# Patient Record
Sex: MALE | Race: BLACK OR AFRICAN AMERICAN | NOT HISPANIC OR LATINO
[De-identification: names, ages, dates, MRNs, and addresses within clinical notes are randomized per-mention and may not be internally consistent; named-entity substitution may affect disease eponyms.]

---

## 2019-05-28 ENCOUNTER — APPOINTMENT (OUTPATIENT)
Dept: RADIOLOGY | Facility: HOSPITAL | Age: 49
End: 2019-05-28

## 2019-05-28 ENCOUNTER — OUTPATIENT (OUTPATIENT)
Dept: OUTPATIENT SERVICES | Facility: HOSPITAL | Age: 49
LOS: 1 days | End: 2019-05-28
Payer: COMMERCIAL

## 2019-05-28 DIAGNOSIS — R76.11 NONSPECIFIC REACTION TO TUBERCULIN SKIN TEST WITHOUT ACTIVE TUBERCULOSIS: ICD-10-CM

## 2019-05-28 PROCEDURE — 71045 X-RAY EXAM CHEST 1 VIEW: CPT | Mod: 26

## 2019-06-20 ENCOUNTER — INPATIENT (INPATIENT)
Facility: HOSPITAL | Age: 49
LOS: 1 days | Discharge: HOME | End: 2019-06-22
Attending: INTERNAL MEDICINE | Admitting: INTERNAL MEDICINE
Payer: MEDICAID

## 2019-06-20 VITALS
DIASTOLIC BLOOD PRESSURE: 92 MMHG | TEMPERATURE: 98 F | RESPIRATION RATE: 18 BRPM | OXYGEN SATURATION: 100 % | SYSTOLIC BLOOD PRESSURE: 142 MMHG | HEART RATE: 109 BPM

## 2019-06-20 LAB
ALBUMIN SERPL ELPH-MCNC: 4.6 G/DL — SIGNIFICANT CHANGE UP (ref 3.3–5)
ALP SERPL-CCNC: 102 U/L — SIGNIFICANT CHANGE UP (ref 40–120)
ALT FLD-CCNC: 30 U/L — SIGNIFICANT CHANGE UP (ref 4–41)
ANION GAP SERPL CALC-SCNC: 11 MMO/L — SIGNIFICANT CHANGE UP (ref 7–14)
AST SERPL-CCNC: 18 U/L — SIGNIFICANT CHANGE UP (ref 4–40)
BASOPHILS # BLD AUTO: 0.03 K/UL — SIGNIFICANT CHANGE UP (ref 0–0.2)
BASOPHILS NFR BLD AUTO: 0.4 % — SIGNIFICANT CHANGE UP (ref 0–2)
BILIRUB SERPL-MCNC: 0.7 MG/DL — SIGNIFICANT CHANGE UP (ref 0.2–1.2)
BUN SERPL-MCNC: 11 MG/DL — SIGNIFICANT CHANGE UP (ref 7–23)
CALCIUM SERPL-MCNC: 9.8 MG/DL — SIGNIFICANT CHANGE UP (ref 8.4–10.5)
CHLORIDE SERPL-SCNC: 104 MMOL/L — SIGNIFICANT CHANGE UP (ref 98–107)
CO2 SERPL-SCNC: 21 MMOL/L — LOW (ref 22–31)
CREAT SERPL-MCNC: 0.9 MG/DL — SIGNIFICANT CHANGE UP (ref 0.5–1.3)
D DIMER BLD IA.RAPID-MCNC: 460 NG/ML — SIGNIFICANT CHANGE UP
EOSINOPHIL # BLD AUTO: 0.22 K/UL — SIGNIFICANT CHANGE UP (ref 0–0.5)
EOSINOPHIL NFR BLD AUTO: 2.9 % — SIGNIFICANT CHANGE UP (ref 0–6)
GLUCOSE SERPL-MCNC: 82 MG/DL — SIGNIFICANT CHANGE UP (ref 70–99)
HCT VFR BLD CALC: 45.8 % — SIGNIFICANT CHANGE UP (ref 39–50)
HGB BLD-MCNC: 15.4 G/DL — SIGNIFICANT CHANGE UP (ref 13–17)
IMM GRANULOCYTES NFR BLD AUTO: 0.3 % — SIGNIFICANT CHANGE UP (ref 0–1.5)
LYMPHOCYTES # BLD AUTO: 2.32 K/UL — SIGNIFICANT CHANGE UP (ref 1–3.3)
LYMPHOCYTES # BLD AUTO: 30.2 % — SIGNIFICANT CHANGE UP (ref 13–44)
MCHC RBC-ENTMCNC: 31 PG — SIGNIFICANT CHANGE UP (ref 27–34)
MCHC RBC-ENTMCNC: 33.6 % — SIGNIFICANT CHANGE UP (ref 32–36)
MCV RBC AUTO: 92.3 FL — SIGNIFICANT CHANGE UP (ref 80–100)
MONOCYTES # BLD AUTO: 0.73 K/UL — SIGNIFICANT CHANGE UP (ref 0–0.9)
MONOCYTES NFR BLD AUTO: 9.5 % — SIGNIFICANT CHANGE UP (ref 2–14)
NEUTROPHILS # BLD AUTO: 4.36 K/UL — SIGNIFICANT CHANGE UP (ref 1.8–7.4)
NEUTROPHILS NFR BLD AUTO: 56.7 % — SIGNIFICANT CHANGE UP (ref 43–77)
NRBC # FLD: 0 K/UL — SIGNIFICANT CHANGE UP (ref 0–0)
PLATELET # BLD AUTO: 282 K/UL — SIGNIFICANT CHANGE UP (ref 150–400)
PMV BLD: 8.9 FL — SIGNIFICANT CHANGE UP (ref 7–13)
POTASSIUM SERPL-MCNC: 4.8 MMOL/L — SIGNIFICANT CHANGE UP (ref 3.5–5.3)
POTASSIUM SERPL-SCNC: 4.8 MMOL/L — SIGNIFICANT CHANGE UP (ref 3.5–5.3)
PROT SERPL-MCNC: 7.7 G/DL — SIGNIFICANT CHANGE UP (ref 6–8.3)
RBC # BLD: 4.96 M/UL — SIGNIFICANT CHANGE UP (ref 4.2–5.8)
RBC # FLD: 13.3 % — SIGNIFICANT CHANGE UP (ref 10.3–14.5)
SODIUM SERPL-SCNC: 136 MMOL/L — SIGNIFICANT CHANGE UP (ref 135–145)
TROPONIN T, HIGH SENSITIVITY: 8 NG/L — SIGNIFICANT CHANGE UP (ref ?–14)
TROPONIN T, HIGH SENSITIVITY: < 6 NG/L — SIGNIFICANT CHANGE UP (ref ?–14)
TSH SERPL-MCNC: 0.81 UIU/ML — SIGNIFICANT CHANGE UP (ref 0.27–4.2)
WBC # BLD: 7.68 K/UL — SIGNIFICANT CHANGE UP (ref 3.8–10.5)
WBC # FLD AUTO: 7.68 K/UL — SIGNIFICANT CHANGE UP (ref 3.8–10.5)

## 2019-06-20 PROCEDURE — 71275 CT ANGIOGRAPHY CHEST: CPT | Mod: 26

## 2019-06-20 PROCEDURE — 71046 X-RAY EXAM CHEST 2 VIEWS: CPT | Mod: 26

## 2019-06-20 RX ORDER — ENOXAPARIN SODIUM 100 MG/ML
80 INJECTION SUBCUTANEOUS ONCE
Refills: 0 | Status: COMPLETED | OUTPATIENT
Start: 2019-06-20 | End: 2019-06-20

## 2019-06-20 RX ADMIN — ENOXAPARIN SODIUM 80 MILLIGRAM(S): 100 INJECTION SUBCUTANEOUS at 23:53

## 2019-06-20 NOTE — ED PROVIDER NOTE - CARE PLAN
Principal Discharge DX:	Chest pain, unspecified type Principal Discharge DX:	Other acute pulmonary embolism with acute cor pulmonale

## 2019-06-20 NOTE — ED PROVIDER NOTE - OBJECTIVE STATEMENT
48 y.o male smoker no pmhx coming in with intermittent, no radiating, no exertional L sided chest pain for the last 2 days, worsens with coughing. This morning woke up felt like his heart was racing. Pt took 2 baby aspirins today. Denies any recent travel. No family hx of heart disease no hx of blood clots. No LE swelling. 48 y.o male smoker no pmhx coming in with intermittent, no radiating, no exertional L sided chest pain for the last 2 days, worsens with coughing. This morning woke up felt like his heart was racing. Pt took 2 baby aspirins today. Denies any recent travel. No family hx of heart disease no hx of blood clots. No LE swelling.    16:17 Hernandez att: 48M smoker c/o cp x 2 days. Past 2 days chest pain, left sided, intermittent, nonexertonal, not pleuritic but worse with cough. Notes some heart racing sensation. Denies travel, leg swellling, prior dvt or pe. No prior cardiac workup. 48 y.o male smoker no pmhx coming in with intermittent, no radiating, no exertional L sided chest pain for the last 2 days, worsens with coughing. This morning woke up felt like his heart was racing. Pt took 2 baby aspirins today. Denies any recent travel. No family hx of heart disease no hx of blood clots. No LE swelling.    16:17 Hernandez att: 48M smoker c/o cp x 2 days. Past 2 days chest pain, left sided, intermittent, nonexertonal, not pleuritic but worse with cough bending or twisting. Notes some heart racing sensation. Denies travel, leg swellling, prior dvt or pe. No prior cardiac workup.

## 2019-06-20 NOTE — ED PROVIDER NOTE - PROGRESS NOTE DETAILS
dylan tinoco: pt seen by MICU, not a candidate at this time. spoke with dr. ugarte accepted to her service. pt VSS , well appearing, no acute distress. discussed directly with MAR on phone.

## 2019-06-20 NOTE — ED PROVIDER NOTE - ATTENDING CONTRIBUTION TO CARE
Dr. Hernandez: I performed a face to face bedside interview with patient regarding history of present illness, review of symptoms and past medical history. I completed an independent physical exam.  I have discussed patient's plan of care with PA.   I agree with note as stated above, having amended the EMR as needed to reflect my findings.   This includes HISTORY OF PRESENT ILLNESS, HIV, PAST MEDICAL/SURGICAL/FAMILY/SOCIAL HISTORY, ALLERGIES AND HOME MEDICATIONS, REVIEW OF SYSTEMS, PHYSICAL EXAM, and any PROGRESS NOTES during the time I functioned as the attending physician for this patient. HPI above as by me. PE above as by me. PLAN cxr, trop, dimer, tsh, cbc, cmp DISPO as per delta trop, either cdu or tele doc office.

## 2019-06-20 NOTE — ED ADULT TRIAGE NOTE - CHIEF COMPLAINT QUOTE
PT C/O left sided CP x 2 days. Pt states he has also had cough for 2 days, states pain is worse when coughing. Breath sounds clear. Denies SOB, fevers, PMH, daily medications.

## 2019-06-20 NOTE — ED ADULT NURSE NOTE - OBJECTIVE STATEMENT
pt is in bed  A and Ox3 in NAD reports " I was having c/p for the last 2 days I am taking aspirin and it is not helping" denies SOB or ULRICH denies cardiac hx. lung sounds clear B/L. orders noted and completed.

## 2019-06-21 DIAGNOSIS — R91.1 SOLITARY PULMONARY NODULE: ICD-10-CM

## 2019-06-21 DIAGNOSIS — I26.99 OTHER PULMONARY EMBOLISM WITHOUT ACUTE COR PULMONALE: ICD-10-CM

## 2019-06-21 DIAGNOSIS — I26.09 OTHER PULMONARY EMBOLISM WITH ACUTE COR PULMONALE: ICD-10-CM

## 2019-06-21 DIAGNOSIS — Z29.9 ENCOUNTER FOR PROPHYLACTIC MEASURES, UNSPECIFIED: ICD-10-CM

## 2019-06-21 LAB
ALBUMIN SERPL ELPH-MCNC: 4 G/DL — SIGNIFICANT CHANGE UP (ref 3.3–5)
ALP SERPL-CCNC: 93 U/L — SIGNIFICANT CHANGE UP (ref 40–120)
ALT FLD-CCNC: 28 U/L — SIGNIFICANT CHANGE UP (ref 4–41)
ANION GAP SERPL CALC-SCNC: 13 MMO/L — SIGNIFICANT CHANGE UP (ref 7–14)
AST SERPL-CCNC: 18 U/L — SIGNIFICANT CHANGE UP (ref 4–40)
BASOPHILS # BLD AUTO: 0.05 K/UL — SIGNIFICANT CHANGE UP (ref 0–0.2)
BASOPHILS NFR BLD AUTO: 0.7 % — SIGNIFICANT CHANGE UP (ref 0–2)
BILIRUB SERPL-MCNC: 0.7 MG/DL — SIGNIFICANT CHANGE UP (ref 0.2–1.2)
BUN SERPL-MCNC: 13 MG/DL — SIGNIFICANT CHANGE UP (ref 7–23)
CALCIUM SERPL-MCNC: 9.1 MG/DL — SIGNIFICANT CHANGE UP (ref 8.4–10.5)
CHLORIDE SERPL-SCNC: 101 MMOL/L — SIGNIFICANT CHANGE UP (ref 98–107)
CHOLEST SERPL-MCNC: 223 MG/DL — HIGH (ref 120–199)
CO2 SERPL-SCNC: 24 MMOL/L — SIGNIFICANT CHANGE UP (ref 22–31)
CREAT SERPL-MCNC: 0.92 MG/DL — SIGNIFICANT CHANGE UP (ref 0.5–1.3)
EOSINOPHIL # BLD AUTO: 0.37 K/UL — SIGNIFICANT CHANGE UP (ref 0–0.5)
EOSINOPHIL NFR BLD AUTO: 5.1 % — SIGNIFICANT CHANGE UP (ref 0–6)
GLUCOSE SERPL-MCNC: 99 MG/DL — SIGNIFICANT CHANGE UP (ref 70–99)
HBA1C BLD-MCNC: 5.6 % — SIGNIFICANT CHANGE UP (ref 4–5.6)
HCT VFR BLD CALC: 42.6 % — SIGNIFICANT CHANGE UP (ref 39–50)
HDLC SERPL-MCNC: 33 MG/DL — LOW (ref 35–55)
HGB BLD-MCNC: 14.3 G/DL — SIGNIFICANT CHANGE UP (ref 13–17)
IMM GRANULOCYTES NFR BLD AUTO: 0.4 % — SIGNIFICANT CHANGE UP (ref 0–1.5)
LIPID PNL WITH DIRECT LDL SERPL: 178 MG/DL — SIGNIFICANT CHANGE UP
LYMPHOCYTES # BLD AUTO: 2.29 K/UL — SIGNIFICANT CHANGE UP (ref 1–3.3)
LYMPHOCYTES # BLD AUTO: 31.6 % — SIGNIFICANT CHANGE UP (ref 13–44)
MAGNESIUM SERPL-MCNC: 2.1 MG/DL — SIGNIFICANT CHANGE UP (ref 1.6–2.6)
MCHC RBC-ENTMCNC: 31.1 PG — SIGNIFICANT CHANGE UP (ref 27–34)
MCHC RBC-ENTMCNC: 33.6 % — SIGNIFICANT CHANGE UP (ref 32–36)
MCV RBC AUTO: 92.6 FL — SIGNIFICANT CHANGE UP (ref 80–100)
MONOCYTES # BLD AUTO: 0.78 K/UL — SIGNIFICANT CHANGE UP (ref 0–0.9)
MONOCYTES NFR BLD AUTO: 10.8 % — SIGNIFICANT CHANGE UP (ref 2–14)
NEUTROPHILS # BLD AUTO: 3.73 K/UL — SIGNIFICANT CHANGE UP (ref 1.8–7.4)
NEUTROPHILS NFR BLD AUTO: 51.4 % — SIGNIFICANT CHANGE UP (ref 43–77)
NRBC # FLD: 0 K/UL — SIGNIFICANT CHANGE UP (ref 0–0)
PHOSPHATE SERPL-MCNC: 4.4 MG/DL — SIGNIFICANT CHANGE UP (ref 2.5–4.5)
PLATELET # BLD AUTO: 252 K/UL — SIGNIFICANT CHANGE UP (ref 150–400)
PMV BLD: 8.9 FL — SIGNIFICANT CHANGE UP (ref 7–13)
POTASSIUM SERPL-MCNC: 4.6 MMOL/L — SIGNIFICANT CHANGE UP (ref 3.5–5.3)
POTASSIUM SERPL-SCNC: 4.6 MMOL/L — SIGNIFICANT CHANGE UP (ref 3.5–5.3)
PROT SERPL-MCNC: 7.5 G/DL — SIGNIFICANT CHANGE UP (ref 6–8.3)
RBC # BLD: 4.6 M/UL — SIGNIFICANT CHANGE UP (ref 4.2–5.8)
RBC # FLD: 13.4 % — SIGNIFICANT CHANGE UP (ref 10.3–14.5)
SODIUM SERPL-SCNC: 138 MMOL/L — SIGNIFICANT CHANGE UP (ref 135–145)
TRIGL SERPL-MCNC: 123 MG/DL — SIGNIFICANT CHANGE UP (ref 10–149)
WBC # BLD: 7.25 K/UL — SIGNIFICANT CHANGE UP (ref 3.8–10.5)
WBC # FLD AUTO: 7.25 K/UL — SIGNIFICANT CHANGE UP (ref 3.8–10.5)

## 2019-06-21 PROCEDURE — 99223 1ST HOSP IP/OBS HIGH 75: CPT | Mod: AI

## 2019-06-21 PROCEDURE — 99223 1ST HOSP IP/OBS HIGH 75: CPT | Mod: GC

## 2019-06-21 RX ORDER — ENOXAPARIN SODIUM 100 MG/ML
90 INJECTION SUBCUTANEOUS EVERY 12 HOURS
Refills: 0 | Status: DISCONTINUED | OUTPATIENT
Start: 2019-06-21 | End: 2019-06-22

## 2019-06-21 RX ORDER — SODIUM CHLORIDE 9 MG/ML
3 INJECTION INTRAMUSCULAR; INTRAVENOUS; SUBCUTANEOUS EVERY 8 HOURS
Refills: 0 | Status: DISCONTINUED | OUTPATIENT
Start: 2019-06-21 | End: 2019-06-22

## 2019-06-21 RX ADMIN — SODIUM CHLORIDE 3 MILLILITER(S): 9 INJECTION INTRAMUSCULAR; INTRAVENOUS; SUBCUTANEOUS at 21:43

## 2019-06-21 RX ADMIN — SODIUM CHLORIDE 3 MILLILITER(S): 9 INJECTION INTRAMUSCULAR; INTRAVENOUS; SUBCUTANEOUS at 13:46

## 2019-06-21 RX ADMIN — ENOXAPARIN SODIUM 90 MILLIGRAM(S): 100 INJECTION SUBCUTANEOUS at 14:07

## 2019-06-21 NOTE — H&P ADULT - NEGATIVE MUSCULOSKELETAL SYMPTOMS
no muscle weakness/no back pain/no leg pain R/no stiffness/no arm pain R/no leg pain L/no joint swelling/no neck pain/no arm pain L

## 2019-06-21 NOTE — CONSULT NOTE ADULT - ATTENDING COMMENTS
47 y/o M smoker w/no significant PMH presenting with dyspnea found to have bilateral PE, emphysema, and RUL pulmonary nodule on CT. Nodule appears more like scarring than malignancy on imaging, however patient is at risk for neoplasm due to smoking history. PE otherwise unprovoked. Patient low risk by PESI criteria, however CT reading possible R heart strain.    - NOAC  - TTE, BNP. Troponin WNL  - Outpatient PET CT for further evaluation of nodule
48M current smoker, no PMHx presenting with chest pain x 2 days, found to have acute bilateral PE with suggestion for RV strain, with 1.1 cm RUL nodule   hemodynamically stable  tte, trend CE  heparin vs lovenox  tele, continuous pulse oxim

## 2019-06-21 NOTE — H&P ADULT - HISTORY OF PRESENT ILLNESS
48 yro male with no PMHx presents to the ED complaining of intermittent nonradiating left-sided sharp chest pain for the past 2 days. Patient states that he first felt the pain when he was walking to work on 6/19, described  5-6/10 pain that is nonpleuritic, but worse when he takes many deep breaths and coughs. The pain is not postprandial, and  nonexertional, as it also occurs when he is sitting. Patient reports that the pain was worse with bending down on the first day, but was not associated with bending down on the second day. Patient also reports some soreness when he touches the left side of the chest. He took 3 aspirin over the course of 2 days, which slightly helped.  He also woke up yesterday feeling like his heart was racing, but it subsided on its own. Patient reports night sweats for the past week, including yesterday, waking up with wet shirts. Pt has been smoking less than a pack of cigarettes a day for 30 years. Denies ever experiencing similar symptoms before. Denies fever, chills, weight loss, fatigue, dizziness, headache, changes in hearing, difficulty swallowing, sob, chronic cough, ULRICH, orthopnea, edema, abdominal pain, n/v/d/c, back/neck pain, arm/leg pain, changes in urinary or bowel patterns, or weakness. No family history of heart disease or history of blood clots. 48 yro male with no PMHx presents to the ED complaining of intermittent nonradiating left-sided sharp chest pain for the past 2 days. Patient states that he first felt the pain when he was walking to work on 6/19, described 5-6/10 pain that is worse when he takes many deep breaths and coughs. The pain is not postprandial and  nonexertional, and it also occurs when he is sitting. Patient reports that the pain was worse with bending down on the first day, but was not associated with bending down on the second day. Patient also reports some soreness when he touches the left side of the chest. He took 3 aspirin over the course of 2 days, which slightly helped.  He also woke up yesterday feeling like his heart was racing, but it subsided on its own. Patient reports night sweats for the past week, including yesterday, waking up with wet shirts. Pt has been smoking less than a pack of cigarettes a day for 30 years. Denies ever experiencing similar symptoms before. Denies fever, chills, weight loss, fatigue, dizziness, headache, changes in hearing, difficulty swallowing, sob, chronic cough, ULRICH, orthopnea, edema, abdominal pain, n/v/d/c, back/neck pain, arm/leg pain, changes in urinary or bowel patterns, or weakness. No family history of heart disease or history of blood clots.

## 2019-06-21 NOTE — H&P ADULT - ASSESSMENT
48 yro male with no PMHx presents to the ED complaining of intermittent nonradiating left-sided sharp chest pain for the past 2 days. EKG shows sinus tachycardia at 106 bpm, with q waves in II, III, aVF. Troponins are neg x2, D-dimer 460, and CXR shows no focal consolidations. CTA chest shows bilateral acute pulmonary arterial emboli with pulmonary infarct. Slight bowing of the interventricular septum which may represent right heart strain. Correlation with echocardiogram is suggested. Indeterminate 1.1 cm right upper lobe solid nodule with spiculated margins. Neoplasm cannot be excluded. Admit to telemetry. 48 yro male with no PMHx presents to the ED complaining of intermittent nonradiating left-sided sharp chest pain for the past 2 days. EKG shows sinus tachycardia at 106 bpm, with q waves in II, III, aVF. Troponins are neg x2, D-dimer 460, and CXR shows no focal consolidations. CTA chest shows bilateral acute pulmonary arterial emboli with pulmonary infarct. Slight bowing of the interventricular septum which may represent right heart strain. Correlation with echocardiogram is suggested. Indeterminate 1.1 cm right upper lobe solid nodule with spiculated margins. Neoplasm cannot be excluded.

## 2019-06-21 NOTE — H&P ADULT - NSICDXPASTMEDICALHX_GEN_ALL_CORE_FT
PAST MEDICAL HISTORY:  Kidney stone 10 years ago PAST MEDICAL HISTORY:  Kidney stone 10 years ago- tx'd with lithotripsy

## 2019-06-21 NOTE — H&P ADULT - NEGATIVE PSYCHIATRIC SYMPTOMS
no suicidal ideation/no depression/no anxiety/no memory loss/no visual hallucinations/no auditory hallucinations

## 2019-06-21 NOTE — CONSULT NOTE ADULT - SUBJECTIVE AND OBJECTIVE BOX
CHIEF COMPLAINT:    HPI: 48M with no PMHx presenting with chest pain x 2 days. Endorses intermittent, non radiating, non exertional L sided chest pain for the last 2 days, worsens with coughing. This morning woke up felt like his heart was racing. Pt took 2 baby aspirins today. Denies any recent travel. No family hx of heart disease no hx of blood clots. No LE swelling.    PAST MEDICAL & SURGICAL HISTORY:  No pertinent past medical history  No significant past surgical history      FAMILY HISTORY:      SOCIAL HISTORY:  Smoking: [ ] Never Smoked [ ] Former Smoker (__ packs x ___ years) [ x] Current Smoker        Allergies    No Known Allergies    Intolerances        HOME MEDICATIONS:  Home Medications:      REVIEW OF SYSTEMS:  Constitutional: [ ] negative [ ] fevers [ ] chills [ ] weight loss [ ] weight gain  HEENT: [ ] negative [ ] dry eyes [ ] eye irritation [ ] postnasal drip [ ] nasal congestion  CV: [ ] negative  [ ] chest pain [ ] orthopnea [ ] palpitations [ ] murmur  Resp: [ ] negative [ ] cough [ ] shortness of breath [ ] dyspnea [ ] wheezing [ ] sputum [ ] hemoptysis  GI: [ ] negative [ ] nausea [ ] vomiting [ ] diarrhea [ ] constipation [ ] abd pain [ ] dysphagia   : [ ] negative [ ] dysuria [ ] nocturia [ ] hematuria [ ] increased urinary frequency  Musculoskeletal: [ ] negative [ ] back pain [ ] myalgias [ ] arthralgias [ ] fracture  Skin: [ ] negative [ ] rash [ ] itch  Neurological: [ ] negative [ ] headache [ ] dizziness [ ] syncope [ ] weakness [ ] numbness  Psychiatric: [ ] negative [ ] anxiety [ ] depression  Endocrine: [ ] negative [ ] diabetes [ ] thyroid problem  Hematologic/Lymphatic: [ ] negative [ ] anemia [ ] bleeding problem  Allergic/Immunologic: [ ] negative [ ] itchy eyes [ ] nasal discharge [ ] hives [ ] angioedema  [ ] All other systems negative  [ ] Unable to assess ROS because ________    OBJECTIVE:  ICU Vital Signs Last 24 Hrs  T(C): 36.7 (20 Jun 2019 22:04), Max: 37.1 (20 Jun 2019 18:35)  T(F): 98.1 (20 Jun 2019 22:04), Max: 98.7 (20 Jun 2019 18:35)  HR: 80 (20 Jun 2019 22:04) (80 - 109)  BP: 147/89 (20 Jun 2019 22:04) (130/80 - 147/89)  BP(mean): --  ABP: --  ABP(mean): --  RR: 18 (20 Jun 2019 22:04) (18 - 18)  SpO2: 100% (20 Jun 2019 22:04) (100% - 100%)        CAPILLARY BLOOD GLUCOSE    · Constitutional        Lying in bed comfortably. No acute distress  · HEENT	               PERRL; EOMI; conjunctiva clear  · Neck	               Supple; no JVD  · Back	                ROM intact  · Respiratory             good air movement; no rales; no rhonchi; no wheezes  · Cardiovascular       S1 & S2 with RRR; no murmurs, rales or JVD  · Gastrointestinal     soft; no distention; bowel sounds normal; no rigidity  · Extremities             no pedal edema  · Vascular	               Radial Pulse: right normal; left normal  · Neurological           alert and oriented x 3; responds to verbal commands; sensation intact; cranial nerves intact; strength normal  · Skin	               warm and dry  · Musculoskeletal    no calf tenderness; diminished strength  · Psychiatric              normal mood and affect  LINES:     HOSPITAL MEDICATIONS:  Standing Meds:      PRN Meds:      LABS:                        15.4   7.68  )-----------( 282      ( 20 Jun 2019 16:50 )             45.8     Hgb Trend: 15.4<--  06-20    136  |  104  |  11  ----------------------------<  82  4.8   |  21<L>  |  0.90    Ca    9.8      20 Jun 2019 16:50    TPro  7.7  /  Alb  4.6  /  TBili  0.7  /  DBili  x   /  AST  18  /  ALT  30  /  AlkPhos  102  06-20    Creatinine Trend: 0.90<--    Troponin T, High Sensitivity (06.20.19 @ 20:45)    Troponin T, High Sensitivity: < 6: ---------------------***PLEASE NOTE***----------------------  Rapid changes upward or downward in high-sensitivity  troponin levels strongly suggest acute myocardial injury.  Hemolysis may falsely lower results. Renal impairment may  increase results.    Normal: <6 - 14 ng/L  Indeterminate: 15 - 51 ng/L  Elevated: >51 ng/L    Please see "[email or web address] on the [facility]  intranet for more information. ng/L      < from: CT Angio Chest w/ IV Cont (06.20.19 @ 21:25) >    IMPRESSION:     Bilateral acute pulmonary arterial emboli with pulmonary infarct.     Slight bowing of the interventricular septum which may represent right   heart strain. Correlation with echocardiogram is suggested.     Indeterminate 1.1 cm right upper lobe solid nodule with spiculated   margins. Neoplasm cannot be excluded.         Findings were discussed with Dr. Hernandez on 6/20/2019 at 9:45 PM by Dr. Rivera with read back confirmation.      < end of copied text > CHIEF COMPLAINT:    HPI: 48M with no PMHx presenting with chest pain x 2 days. Endorses intermittent, non radiating, non exertional L sided chest pain for the last 2 days. Patient reports no prior occurrence Endorses pleuritic in nature. Endorses palpitations SOB, tachycardia, denies hemoptysis or current lower extremity swelling. However, he does note 2 years ago he had severe 10/10 L calf pain with associated edema and erythema, that took months to improve. He did not see a physician at that time because he did not have insurance.  Patient did take ASA 81 x 2 this AM due to the chest pain. Denies any recent travel. No family hx of clot or malignancy. Current smoker 1 PPD x 30 years. Has not seen a PCP in >5 years.     PAST MEDICAL & SURGICAL HISTORY:  No pertinent past medical history  No significant past surgical history      FAMILY HISTORY:      SOCIAL HISTORY:  Smoking: [ ] Never Smoked [ ] Former Smoker (__ packs x ___ years) [ x] Current Smoker        Allergies    No Known Allergies    Intolerances        HOME MEDICATIONS:  Home Medications:      REVIEW OF SYSTEMS:  Constitutional: [x ] negative [ ] fevers [ ] chills [ ] weight loss [ ] weight gain  HEENT: [x ] negative [ ] dry eyes [ ] eye irritation [ ] postnasal drip [ ] nasal congestion  CV: [ ] negative  [x ] chest pain [ ] orthopnea [x ] palpitations [ ] murmur  Resp: [ ] negative [ ] cough [x ] shortness of breath [ ] dyspnea [ ] wheezing [ ] sputum [ ] hemoptysis  GI: [x] negative [ ] nausea [ ] vomiting [ ] diarrhea [ ] constipation [ ] abd pain [ ] dysphagia   : [ x] negative [ ] dysuria [ ] nocturia [ ] hematuria [ ] increased urinary frequency  Musculoskeletal: [ x] negative [ ] back pain [ ] myalgias [ ] arthralgias [ ] fracture  Skin: [x ] negative [ ] rash [ ] itch  Neurological: [x ] negative [ ] headache [ ] dizziness [ ] syncope [ ] weakness [ ] numbness  Psychiatric: [x ] negative [ ] anxiety [ ] depression  Endocrine: [x ] negative [ ] diabetes [ ] thyroid problem  Hematologic/Lymphatic: [ ] negative [ ] anemia [ ] bleeding problem  Allergic/Immunologic: [ ] negative [ ] itchy eyes [ ] nasal discharge [ ] hives [ ] angioedema  [ ] All other systems negative  [ ] Unable to assess ROS because ________    OBJECTIVE:  ICU Vital Signs Last 24 Hrs  T(C): 36.7 (20 Jun 2019 22:04), Max: 37.1 (20 Jun 2019 18:35)  T(F): 98.1 (20 Jun 2019 22:04), Max: 98.7 (20 Jun 2019 18:35)  HR: 80 (20 Jun 2019 22:04) (80 - 109)  BP: 147/89 (20 Jun 2019 22:04) (130/80 - 147/89)  BP(mean): --  ABP: --  ABP(mean): --  RR: 18 (20 Jun 2019 22:04) (18 - 18)  SpO2: 100% (20 Jun 2019 22:04) (100% - 100%)        CAPILLARY BLOOD GLUCOSE    · Constitutional        middle aged male Lying in bed comfortably. No acute distress  · HEENT	               PERRL; EOMI; conjunctiva clear  · Neck	               Supple  · Respiratory             good air movement; no rales; no rhonchi; no wheezes  · Cardiovascular       S1 & S2 with RRR; no murmurs, rales or JVD  · Gastrointestinal     soft; no distention; bowel sounds normal; no rigidity  · Extremities            1+ pitting edema to mid calf bilaterally   · Vascular	               Radial Pulse: right normal; left normal  · Neurological           alert and oriented x 3; responds to verbal commands; grossly nonfocal     LINES:     HOSPITAL MEDICATIONS:  Standing Meds:      PRN Meds:      LABS:                        15.4   7.68  )-----------( 282      ( 20 Jun 2019 16:50 )             45.8     Hgb Trend: 15.4<--  06-20    136  |  104  |  11  ----------------------------<  82  4.8   |  21<L>  |  0.90    Ca    9.8      20 Jun 2019 16:50    TPro  7.7  /  Alb  4.6  /  TBili  0.7  /  DBili  x   /  AST  18  /  ALT  30  /  AlkPhos  102  06-20    Creatinine Trend: 0.90<--    Troponin T, High Sensitivity (06.20.19 @ 20:45)    Troponin T, High Sensitivity: < 6: ---------------------***PLEASE NOTE***----------------------  Rapid changes upward or downward in high-sensitivity  troponin levels strongly suggest acute myocardial injury.  Hemolysis may falsely lower results. Renal impairment may  increase results.    Normal: <6 - 14 ng/L  Indeterminate: 15 - 51 ng/L  Elevated: >51 ng/L    Please see "[email or web address] on the [employer]  intranet for more information. ng/L      < from: CT Angio Chest w/ IV Cont (06.20.19 @ 21:25) >    IMPRESSION:     Bilateral acute pulmonary arterial emboli with pulmonary infarct.     Slight bowing of the interventricular septum which may represent right   heart strain. Correlation with echocardiogram is suggested.     Indeterminate 1.1 cm right upper lobe solid nodule with spiculated   margins. Neoplasm cannot be excluded.         Findings were discussed with Dr. Hernandez on 6/20/2019 at 9:45 PM by Dr. Rivera with read back confirmation.      < end of copied text >

## 2019-06-21 NOTE — H&P ADULT - NEGATIVE ENMT SYMPTOMS
no nasal congestion/no nose bleeds/no dry mouth/no ear pain/no throat pain/no dysphagia/no gum bleeding/no nasal discharge/no tinnitus/no hearing difficulty/no vertigo

## 2019-06-21 NOTE — H&P ADULT - ATTENDING COMMENTS
Pt was seen and examined. Pt is aaox3, comfortable, hemodynamically stable.   b/l PE with suspicion of lung ca. Pt is a smoker, quit recently per pt.   c/w lovenox 1mg/kg bid. I asked RN to weigh pt  pulm consulted

## 2019-06-21 NOTE — H&P ADULT - NEGATIVE RESPIRATORY AND THORAX SYMPTOMS
no wheezing/no dyspnea/no hemoptysis/no cough/no pleuritic chest pain no wheezing/no dyspnea/no hemoptysis/no cough

## 2019-06-21 NOTE — H&P ADULT - NEGATIVE GASTROINTESTINAL SYMPTOMS
no melena/no abdominal pain/no diarrhea/no change in bowel habits/no hematochezia/no constipation/no nausea/no vomiting

## 2019-06-21 NOTE — H&P ADULT - NEGATIVE NEUROLOGICAL SYMPTOMS
no generalized seizures/no loss of sensation/no difficulty walking/no confusion/no focal seizures/no paresthesias/no loss of consciousness/no weakness/no headache/no syncope

## 2019-06-21 NOTE — H&P ADULT - PROBLEM SELECTOR PLAN 1
Lovenox started   ECHO and TTE ordered   pulmonology consult Admit to Telemetry, CTPA done, Lovenox started, check ECHO, ICU c/s in chart, Consider pulmonology consult

## 2019-06-21 NOTE — CONSULT NOTE ADULT - SUBJECTIVE AND OBJECTIVE BOX
CHIEF COMPLAINT: PE    HPI: 49 y/o male with no known PMHx presents to the ED complaining of intermittent nonradiating left-sided sharp chest pain for the past 2 days. Patient states that he first felt the pain when he was walking to work on 6/19, described 5-6/10 pain that is worse when he takes many deep breaths and coughs.  Patient reports that the pain was worse with bending down on the first day, but was not associated with bending down on the second day. Patient also reports some soreness when he touches the left side of the chest. He took 3 aspirin over the course of 2 days, which slightly helped.  He also woke up yesterday feeling like his heart was racing, but it subsided on its own. Pt has been smoking less than a pack of cigarettes a day for 30 years but states he will quit. Pulmonary was consulted after the patient was found to have bilateral PEs and a nodule on CT scan.  He denies fever, chills, weight loss, fatigue, dizziness, headache, changes in hearing, difficulty swallowing, sob, chronic cough, ULRICH, orthopnea, edema, abdominal pain, n/v/d/c, back/neck pain, arm/leg pain, changes in urinary or bowel patterns, or weakness. No family history of heart disease or history of blood clots.      PAST MEDICAL & SURGICAL HISTORY:  Kidney stone: 10 years ago- tx&#x27;d with lithotripsy  No significant past surgical history      FAMILY HISTORY:  FHx: emphysema: father      SOCIAL HISTORY:  Active smoker      Allergies    No Known Allergies    Intolerances        HOME MEDICATIONS:  Home Medications:      REVIEW OF SYSTEMS:  Constitutional: [ ] negative [ ] fevers [ ] chills [ ] weight loss [ ] weight gain  HEENT: [ ] negative [ ] dry eyes [ ] eye irritation [ ] postnasal drip [ ] nasal congestion  CV: [ ] negative  [ ] chest pain [ ] orthopnea [ ] palpitations [ ] murmur  Resp: [ ] negative [ ] cough [ ] shortness of breath [ ] dyspnea [ ] wheezing [ ] sputum [ ] hemoptysis  GI: [ ] negative [ ] nausea [ ] vomiting [ ] diarrhea [ ] constipation [ ] abd pain [ ] dysphagia   : [ ] negative [ ] dysuria [ ] nocturia [ ] hematuria [ ] increased urinary frequency  Musculoskeletal: [ ] negative [ ] back pain [ ] myalgias [ ] arthralgias [ ] fracture  Skin: [ ] negative [ ] rash [ ] itch  Neurological: [ ] negative [ ] headache [ ] dizziness [ ] syncope [ ] weakness [ ] numbness  Psychiatric: [ ] negative [ ] anxiety [ ] depression  Endocrine: [ ] negative [ ] diabetes [ ] thyroid problem  Hematologic/Lymphatic: [ ] negative [ ] anemia [ ] bleeding problem  Allergic/Immunologic: [ ] negative [ ] itchy eyes [ ] nasal discharge [ ] hives [ ] angioedema  [x ] All other systems negative except as stated in the HPI  [ ] Unable to assess ROS because ________    OBJECTIVE:  ICU Vital Signs Last 24 Hrs  T(C): 36.8 (21 Jun 2019 06:07), Max: 37.1 (20 Jun 2019 18:35)  T(F): 98.3 (21 Jun 2019 06:07), Max: 98.7 (20 Jun 2019 18:35)  HR: 86 (21 Jun 2019 10:48) (80 - 94)  BP: 124/71 (21 Jun 2019 10:48) (110/63 - 147/89)  BP(mean): --  ABP: --  ABP(mean): --  RR: 19 (21 Jun 2019 10:48) (16 - 19)  SpO2: 98% (21 Jun 2019 10:48) (98% - 100%)        CAPILLARY BLOOD GLUCOSE          PHYSICAL EXAM:  General: nad, speaking in full sentences  HEENT: nc/at  Lymph Nodes: no lad  Neck: no jvd  Respiratory: ctab   Cardiovascular: rrr, nls1s2, no m/r/g  Abdomen: soft, ntnd  Extremities: no c/c/e  Skin: no rashes  Neurological: non-focal, juano  Psychiatry: aox3, appropriate    HOSPITAL MEDICATIONS:  Standing Meds:  enoxaparin Injectable 90 milliGRAM(s) SubCutaneous every 12 hours  sodium chloride 0.9% lock flush 3 milliLiter(s) IV Push every 8 hours      PRN Meds:      LABS:                        14.3   7.25  )-----------( 252      ( 21 Jun 2019 07:42 )             42.6     Hgb Trend: 14.3<--, 15.4<--  06-21    138  |  101  |  13  ----------------------------<  99  4.6   |  24  |  0.92    Ca    9.1      21 Jun 2019 07:42  Phos  4.4     06-21  Mg     2.1     06-21    TPro  7.5  /  Alb  4.0  /  TBili  0.7  /  DBili  x   /  AST  18  /  ALT  28  /  AlkPhos  93  06-21    Creatinine Trend: 0.92<--, 0.90<--            MICROBIOLOGY:       RADIOLOGY:    [ x] Reviewed and interpreted by me  < from: CT Angio Chest w/ IV Cont (06.20.19 @ 21:25) >  IMPRESSION:     Bilateral acute pulmonary arterial emboli with pulmonary infarct.     Slight bowing of the interventricular septum which may represent right   heart strain. Correlation with echocardiogram is suggested.     Indeterminate 1.1 cm right upper lobe solid nodule with spiculated   margins. Neoplasm cannot be excluded.     < end of copied text >

## 2019-06-21 NOTE — CONSULT NOTE ADULT - ASSESSMENT
49 y/o male with no known PMHx presents to the ED complaining of intermittent nonradiating left-sided sharp chest pain for the past 2 days found to have bilateral PE, emphysema, and a RUL pulmonary nodule. Pulmonary consulted for assistance in management    #PE  -Would obtain BNP for prognostication  -TTE  -Troponin unremarkable  -Can likely transition to NOAC pending insurance     #RUL nodule  -Does not appear to be malignant but cannot rule out underlying malignancy. Given location and after discsussion with patient, would opt for outpatient PET scan to risk stratify nodule  -Patient can follow up with us outpatient at [address] [place] [address], [address]. Number [phone]  Appointments can also be made by e-mailing [email or web address] and providing patient's name, , and discharge diagnosis    Jatinder Mon MD, PGY4  Pulmonary and Critical Care Fellow  [phone]

## 2019-06-21 NOTE — H&P ADULT - NEUROLOGICAL DETAILS
responds to verbal commands/normal strength/sensation intact/cranial nerves intact/alert and oriented x 3/responds to pain

## 2019-06-21 NOTE — H&P ADULT - PROBLEM SELECTOR PLAN 2
pt already on Lovenox CTPA done- Indeterminate 1.1 cm right upper lobe solid nodule with spiculated margins, neoplasm cannot be excluded. Consider Pulmonary eval

## 2019-06-21 NOTE — H&P ADULT - NSHPSOCIALHISTORY_GEN_ALL_CORE
Patient is single, currently living alone. 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  Denies alcohol and drug use.  Reports smoking less than a pack a day for 30 years. Patient is single, currently living alone. 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  Denies alcohol and drug use.  Reports smoking less than a pack a day for 30 years.

## 2019-06-21 NOTE — CONSULT NOTE ADULT - ASSESSMENT
48M with no PMHx presenting with chest pain x 2 days, found to have acute bilateral PE with suggestion for RV strain, with 1.1 cm RUL nodule     #Bilateral PE  Troponin  8 -->6   Consider pro-BNP   EKG  Would start heparin gtt  Given 1.1 cm RUL nodule and current smoker, would pursue malignancy work up  CT C/A/P  Heme/Onc consult in AM     Note to be finished 48M current smoker, no PMHx presenting with chest pain x 2 days, found to have acute bilateral PE with suggestion for RV strain, with 1.1 cm RUL nodule     #Bilateral PE  Troponin  8 -->6   F/u proBNP  EKG no evidence of RV strain, NSR, tachycardic to 106, no TWI, STD/E  S/p Lovenox 80 mg IV x 1, can consider DOAC for transition  However given 1.1 cm RUL nodule and current smoker, would consider malignancy work up i.e consider CT C/A/P  PESI 20, low risk   F/u formal TTE   Tele monitoring and continuos pulse ox     Patient hemodynamically stable, not requiring supplemental oxygen. Patient does not need MICU level of care at this time.     Case discussed with MICU attending     Naila Evans, PGY2.

## 2019-06-22 ENCOUNTER — TRANSCRIPTION ENCOUNTER (OUTPATIENT)
Age: 49
End: 2019-06-22

## 2019-06-22 VITALS
OXYGEN SATURATION: 96 % | RESPIRATION RATE: 18 BRPM | DIASTOLIC BLOOD PRESSURE: 71 MMHG | HEART RATE: 85 BPM | TEMPERATURE: 99 F | SYSTOLIC BLOOD PRESSURE: 111 MMHG

## 2019-06-22 LAB
ANION GAP SERPL CALC-SCNC: 13 MMO/L — SIGNIFICANT CHANGE UP (ref 7–14)
APTT BLD: 34.5 SEC — SIGNIFICANT CHANGE UP (ref 27.5–36.3)
BUN SERPL-MCNC: 14 MG/DL — SIGNIFICANT CHANGE UP (ref 7–23)
CALCIUM SERPL-MCNC: 9.4 MG/DL — SIGNIFICANT CHANGE UP (ref 8.4–10.5)
CHLORIDE SERPL-SCNC: 103 MMOL/L — SIGNIFICANT CHANGE UP (ref 98–107)
CO2 SERPL-SCNC: 20 MMOL/L — LOW (ref 22–31)
CREAT SERPL-MCNC: 0.87 MG/DL — SIGNIFICANT CHANGE UP (ref 0.5–1.3)
GLUCOSE SERPL-MCNC: 109 MG/DL — HIGH (ref 70–99)
HCT VFR BLD CALC: 43.7 % — SIGNIFICANT CHANGE UP (ref 39–50)
HGB BLD-MCNC: 14.5 G/DL — SIGNIFICANT CHANGE UP (ref 13–17)
INR BLD: 1.03 — SIGNIFICANT CHANGE UP (ref 0.88–1.17)
MCHC RBC-ENTMCNC: 30.5 PG — SIGNIFICANT CHANGE UP (ref 27–34)
MCHC RBC-ENTMCNC: 33.2 % — SIGNIFICANT CHANGE UP (ref 32–36)
MCV RBC AUTO: 92 FL — SIGNIFICANT CHANGE UP (ref 80–100)
NRBC # FLD: 0 K/UL — SIGNIFICANT CHANGE UP (ref 0–0)
PLATELET # BLD AUTO: 267 K/UL — SIGNIFICANT CHANGE UP (ref 150–400)
PMV BLD: 9 FL — SIGNIFICANT CHANGE UP (ref 7–13)
POTASSIUM SERPL-MCNC: 4.3 MMOL/L — SIGNIFICANT CHANGE UP (ref 3.5–5.3)
POTASSIUM SERPL-SCNC: 4.3 MMOL/L — SIGNIFICANT CHANGE UP (ref 3.5–5.3)
PROTHROM AB SERPL-ACNC: 11.8 SEC — SIGNIFICANT CHANGE UP (ref 9.8–13.1)
RBC # BLD: 4.75 M/UL — SIGNIFICANT CHANGE UP (ref 4.2–5.8)
RBC # FLD: 13.3 % — SIGNIFICANT CHANGE UP (ref 10.3–14.5)
SODIUM SERPL-SCNC: 136 MMOL/L — SIGNIFICANT CHANGE UP (ref 135–145)
WBC # BLD: 6.21 K/UL — SIGNIFICANT CHANGE UP (ref 3.8–10.5)
WBC # FLD AUTO: 6.21 K/UL — SIGNIFICANT CHANGE UP (ref 3.8–10.5)

## 2019-06-22 PROCEDURE — 99239 HOSP IP/OBS DSCHRG MGMT >30: CPT | Mod: GC

## 2019-06-22 PROCEDURE — 93306 TTE W/DOPPLER COMPLETE: CPT | Mod: 26

## 2019-06-22 RX ORDER — APIXABAN 2.5 MG/1
2 TABLET, FILM COATED ORAL
Qty: 120 | Refills: 0
Start: 2019-06-22 | End: 2019-07-21

## 2019-06-22 RX ORDER — APIXABAN 2.5 MG/1
2 TABLET, FILM COATED ORAL
Qty: 74 | Refills: 0
Start: 2019-06-22 | End: 2019-07-21

## 2019-06-22 RX ADMIN — SODIUM CHLORIDE 3 MILLILITER(S): 9 INJECTION INTRAMUSCULAR; INTRAVENOUS; SUBCUTANEOUS at 05:33

## 2019-06-22 RX ADMIN — SODIUM CHLORIDE 3 MILLILITER(S): 9 INJECTION INTRAMUSCULAR; INTRAVENOUS; SUBCUTANEOUS at 12:23

## 2019-06-22 RX ADMIN — ENOXAPARIN SODIUM 90 MILLIGRAM(S): 100 INJECTION SUBCUTANEOUS at 12:22

## 2019-06-22 RX ADMIN — ENOXAPARIN SODIUM 90 MILLIGRAM(S): 100 INJECTION SUBCUTANEOUS at 02:52

## 2019-06-22 NOTE — DISCHARGE NOTE PROVIDER - NSDCCPCAREPLAN_GEN_ALL_CORE_FT
PRINCIPAL DISCHARGE DIAGNOSIS  Diagnosis: Other acute pulmonary embolism with acute cor pulmonale  Assessment and Plan of Treatment: Patient resented to the ED complaining of intermittent nonradiating left-sided sharp chest pain for the past 2 days. EKG shows sinus tachycardia at 106 bpm, with q waves in II, III, aVF. Troponins are neg x2, D-dimer 460, and CXR showed no focal consolidations. CTA chest shows bilateral acute pulmonary arterial emboli with pulmonary infarct, slight bowing of the interventricular septum which may represent right heart strain. Correlation with echocardiogram was suggested. Indeterminate 1.1 cm right upper lobe solid nodule with spiculated margins. Neoplasm cannot be excluded.   Lovenox was started. Hemodynamically stable and deemed not to be MICU candidate. Troponins unremarkable. Seen by pulmonary team. Echo results: 1. Normal mitral valve. Minimal mitral regurgitation. 2. Normal trileaflet aortic valve. No aortic valve regurgitation seen. 3. Normal left ventricular systolic function. No segmental wall motion abnormalities. 4. Normal right ventricular size and function. *** No previous Echo exam.  Transitioned patient to NOAC - covered with $3 copay.   Follow up with PCP within 1-2 weeks of discharge.   Follow up with pulmonology within 1-2 weeks of discharge.      SECONDARY DISCHARGE DIAGNOSES  Diagnosis: Pulmonary nodule, right  Assessment and Plan of Treatment: Seen by pulmonary team for lung nodule - did not think pulmonary nodule was malignant but could not be ruled out due to nodule location and smoking history. Outpatient PET scan to risk stratify nodule.  Patient can follow up as an outpatient at [address] [place] [address], [address]. Number [phone]. Emailed pulmonary clinic for appointment as well.

## 2019-06-22 NOTE — PROGRESS NOTE ADULT - PROBLEM SELECTOR PLAN 1
patient without desaturating, HR not tachycardic on telemetry.  TTE without RHS. Patient stable for discharge on DOAC "(eliquis sent to pharmacy awaiting price point).   -follow up email sent to pul to follow up.

## 2019-06-22 NOTE — DISCHARGE NOTE PROVIDER - CARE PROVIDER_API CALL
Jatinder Alvarez)  Internal Medicine; Pulmonary Disease  [address] [place] [address]  Phone: [phone]  Fax: [phone]  Follow Up Time:

## 2019-06-22 NOTE — DISCHARGE NOTE NURSING/CASE MANAGEMENT/SOCIAL WORK - NSDCPEEMAIL_GEN_ALL_CORE
[facility] for Tobacco Control email [email or web address]

## 2019-06-22 NOTE — DISCHARGE NOTE NURSING/CASE MANAGEMENT/SOCIAL WORK - NSDCFUADDAPPT_GEN_ALL_CORE_FT
Follow up with PCP within 1-2 weeks of discharge.   Follow up with pulmonology within 1-2 weeks of discharge - Patient can follow up as an outpatient at [address] [place] [address] --- Phone Number [phone]

## 2019-06-22 NOTE — DISCHARGE NOTE PROVIDER - NSDCFUADDAPPT_GEN_ALL_CORE_FT
Follow up with PCP within 1-2 weeks of discharge.   Follow up with pulmonology within 1-2 weeks of discharge - Patient can follow up as an outpatient at [address] [place] [address] --- Phone Number [phone]

## 2019-06-22 NOTE — DISCHARGE NOTE NURSING/CASE MANAGEMENT/SOCIAL WORK - NSDCDPATPORTLINK_GEN_ALL_CORE
You can access the [facility] Portal, offered by [facility], by registering with the following website: [email or web address]

## 2019-06-22 NOTE — DISCHARGE NOTE PROVIDER - HOSPITAL COURSE
47 y/o male with no PMHx who presents to the ED complaining of intermittent nonradiating left-sided sharp chest pain for the past 2 days. EKG shows sinus tachycardia at 106 bpm, with q waves in II, III, aVF. Troponins are neg x2, D-dimer 460, and CXR shows no focal consolidations. CTA chest shows bilateral acute pulmonary arterial emboli with pulmonary infarct. Slight bowing of the interventricular septum which may represent right heart strain. Correlation with echocardiogram is suggested. Indeterminate 1.1 cm right upper lobe solid nodule with spiculated margins. Neoplasm cannot be excluded.         Problem/Plan - 1:    ·  Problem: Acute pulmonary embolism.  Plan: Admit to Telemetry, CTPA done, Lovenox started, check ECHO, ICU c/s in chart, Consider pulmonology consult.          Problem/Plan - 2:    ·  Problem: Pulmonary nodule, right.  Plan: CTPA done- Indeterminate 1.1 cm right upper lobe solid nodule with spiculated margins, neoplasm cannot be excluded. Consider Pulmonary eval.              Attending Statement:    Pt was seen and examined. Pt is aaox3, comfortable, hemodynamically stable.     b/l PE with suspicion of lung ca. Pt is a smoker, quit recently per pt.     c/w lovenox 1mg/kg bid. I asked RN to weigh pt    pulm consulted .             47 y/o male with no known PMHx presents to the ED complaining of intermittent nonradiating left-sided sharp chest pain for the past 2 days found to have bilateral PE, emphysema, and a RUL pulmonary nodule. Pulmonary consulted for assistance in management        #PE    -Would obtain BNP for prognostication    -TTE    -Troponin unremarkable    -Can likely transition to NOAC pending insurance         #RUL nodule    -Does not appear to be malignant but cannot rule out underlying malignancy. Given location and after discsussion with patient, would opt for outpatient PET scan to risk stratify nodule    -Patient can follow up with us outpatient at [address] [place] [address], [address]. Number [phone]    Appointments can also be made by e-mailing [email or web address] and providing patient's name, , and discharge diagnosis        Jatinder Mon MD, PGY4    Pulmonary and Critical Care Fellow    [id number]/[phone]                 Attending Attestation:     47 y/o M smoker w/no significant PMH presenting with dyspnea found to have bilateral PE, emphysema, and RUL pulmonary nodule on CT. Nodule appears more like scarring than malignancy on imaging, however patient is at risk for neoplasm due to smoking history. PE otherwise unprovoked. Patient low risk by PESI criteria, however CT reading possible R heart strain.        - NOAC    - TTE, BNP. Troponin WNL    - Outpatient PET CT for further evaluation of nodule. 47 y/o male with no PMHx who presented to the ED complaining of intermittent nonradiating left-sided sharp chest pain for the past 2 days. EKG shows sinus tachycardia at 106 bpm, with q waves in II, III, aVF. Troponins are neg x2, D-dimer 460, and CXR showed no focal consolidations. CTA chest shows bilateral acute pulmonary arterial emboli with pulmonary infarct, slight bowing of the interventricular septum which may represent right heart strain. Correlation with echocardiogram is suggested. Indeterminate 1.1 cm right upper lobe solid nodule with spiculated margins. Neoplasm cannot be excluded.        1. Acute pulmonary embolism: CT Chest showed acute pulmonary bilateral emboli. Lovenox was started. Hemodynamically stable and deemed not to be MICU candidate. Troponins unremarkable. Seen by pulmonary team. Echo results:     Transitioned patient to NOAC.         2. Pulmonary nodule: Seen by pulmonary team who did not think pulmonary nodule was malignant but cannot be ruled out due to nodule location and smoking history. Outpatient PET scan to risk stratify nodule.  Patient can follow up as an outpatient at [address] [place] [address], [address]. Number [phone]. Emailed pulmonary clinic for appointment as well.                 Patient seen and evaluated, no acute somatic complaints. Medically cleared/stable for discharge as per Dr. Harvey with close outpatient follow up within 1-2 weeks of discharge. Patient understands and agrees with plan of care. 49 y/o male with no PMHx who presented to the ED complaining of intermittent nonradiating left-sided sharp chest pain for the past 2 days. EKG shows sinus tachycardia at 106 bpm, with q waves in II, III, aVF. Troponins are neg x2, D-dimer 460, and CXR showed no focal consolidations. CTA chest shows bilateral acute pulmonary arterial emboli with pulmonary infarct, slight bowing of the interventricular septum which may represent right heart strain. Correlation with echocardiogram is suggested. Indeterminate 1.1 cm right upper lobe solid nodule with spiculated margins. Neoplasm cannot be excluded.        1. Acute pulmonary embolism: CT Chest showed acute pulmonary bilateral emboli. Lovenox was started. Hemodynamically stable and deemed not to be MICU candidate. Troponins unremarkable. Seen by pulmonary team. Echo results: 1. Normal mitral valve. Minimal mitral regurgitation. 2. Normal trileaflet aortic valve. No aortic valve regurgitation seen. 3. Normal left ventricular systolic function. No segmental wall motion abnormalities. 4. Normal right ventricular size and function. *** No previous Echo exam.    Transitioned patient to NOAC - covered with $3 copay.         2. Pulmonary nodule: Seen by pulmonary team who did not think pulmonary nodule was malignant but cannot be ruled out due to nodule location and smoking history. Outpatient PET scan to risk stratify nodule.  Patient can follow up as an outpatient at [address] [place] [address], [address]. Number [phone]. Emailed pulmonary clinic for appointment as well.             Patient seen and evaluated, no acute somatic complaints. Medically cleared/stable for discharge as per Dr. Harvey with close outpatient follow up within 1-2 weeks of discharge. Patient understands and agrees with plan of care.

## 2019-06-22 NOTE — DISCHARGE NOTE NURSING/CASE MANAGEMENT/SOCIAL WORK - NSDCPEWEB_GEN_ALL_CORE
[facility] for Tobacco Control website --- [email or web address] website --- [email or web address]

## 2019-06-22 NOTE — PROGRESS NOTE ADULT - SUBJECTIVE AND OBJECTIVE BOX
Authored by Salvador Harvey, DO: Beeper#[phone]    KELLER, DUANE  48y  Male      Patient is a 48y old  Male who presents with a chief complaint of chest pain (22 Jun 2019 10:59)      INTERVAL HPI/OVERNIGHT EVENTS:  Patient admitted overnight. Patient wiht improved pleuritic chest pain.  Patient ambulated without desaturating on room air. TTE performed without RHS. Patient offers no other complaitns. No cough, hemoptysis.             T(C): 37.1 (06-22-19 @ 13:35), Max: 37.1 (06-22-19 @ 13:35)  HR: 85 (06-22-19 @ 13:35) (80 - 85)  BP: 111/71 (06-22-19 @ 13:35) (111/71 - 124/68)  RR: 18 (06-22-19 @ 13:35) (17 - 19)  SpO2: 96% (06-22-19 @ 13:35) (94% - 100%)  Wt(kg): --Vital Signs Last 24 Hrs  T(C): 37.1 (22 Jun 2019 13:35), Max: 37.1 (22 Jun 2019 13:35)  T(F): 98.8 (22 Jun 2019 13:35), Max: 98.8 (22 Jun 2019 13:35)  HR: 85 (22 Jun 2019 13:35) (80 - 85)  BP: 111/71 (22 Jun 2019 13:35) (111/71 - 124/68)  BP(mean): --  RR: 18 (22 Jun 2019 13:35) (17 - 19)  SpO2: 96% (22 Jun 2019 13:35) (94% - 100%)    PHYSICAL EXAM:  GENERAL: NAD, well-groomed, well-developed  HEENT: Atraumatic, normocephalic.  Anicteric sclera. moist mucous membranes.  CHEST/LUNG: Clear to percussion bilaterally; No rales, rhonchi, wheezing, or rubs  HEART: Regular rate and rhythm; No murmurs, rubs, or gallops  ABDOMEN: Soft, Nontender, Nondistended; Bowel sounds present.    EXTREMITIES:  2+ Peripheral Pulses, No clubbing, cyanosis, or edema  SKIN: No rashes or lesions  PSYCH: Alert & Oriented x3  NERVOUS SYSTEM:  ; Motor Strength 5/5 B/L upper and lower extremities.  Sensory intact    Consultant(s) Notes Reviewed:  [x ] YES  [ ] NO: Pulmonology.   Care Discussed with Consultants/Other Providers [ x] YES  [ ] NO    LABS:                        14.5   6.21  )-----------( 267      ( 22 Jun 2019 05:55 )             43.7     06-22    136  |  103  |  14  ----------------------------<  109<H>  4.3   |  20<L>  |  0.87    Ca    9.4      22 Jun 2019 05:55  Phos  4.4     06-21  Mg     2.1     06-21    TPro  7.5  /  Alb  4.0  /  TBili  0.7  /  DBili  x   /  AST  18  /  ALT  28  /  AlkPhos  93  06-21    PT/INR - ( 22 Jun 2019 05:55 )   PT: 11.8 SEC;   INR: 1.03          PTT - ( 22 Jun 2019 05:55 )  PTT:34.5 SEC    CAPILLARY BLOOD GLUCOSE                RADIOLOGY & ADDITIONAL TESTS:    Imaging Personally Reviewed:  [x ] YES  [ ] NO  < from: Transthoracic Echocardiogram (06.22.19 @ 10:11) >  1. Normal mitral valve. Minimal mitral regurgitation.  2. Normal trileaflet aortic valve. No aortic valve  regurgitation seen.  3. Normal left ventricular systolic function. No segmental  wall motion abnormalities.  4. Normal right ventricular size and function.  *** No previous Echo exam.    < end of copied text >

## 2019-06-28 PROBLEM — N20.0 CALCULUS OF KIDNEY: Chronic | Status: ACTIVE | Noted: 2019-06-21

## 2019-07-01 ENCOUNTER — APPOINTMENT (OUTPATIENT)
Age: 49
End: 2019-07-01
Payer: MEDICAID

## 2019-07-01 VITALS
SYSTOLIC BLOOD PRESSURE: 143 MMHG | OXYGEN SATURATION: 98 % | HEART RATE: 78 BPM | RESPIRATION RATE: 16 BRPM | TEMPERATURE: 97.7 F | DIASTOLIC BLOOD PRESSURE: 81 MMHG | WEIGHT: 212 LBS | BODY MASS INDEX: 28.71 KG/M2 | HEIGHT: 72.21 IN

## 2019-07-01 DIAGNOSIS — Z87.891 PERSONAL HISTORY OF NICOTINE DEPENDENCE: ICD-10-CM

## 2019-07-01 DIAGNOSIS — Z20.1 CONTACT WITH AND (SUSPECTED) EXPOSURE TO TUBERCULOSIS: ICD-10-CM

## 2019-07-01 DIAGNOSIS — Z83.6 FAMILY HISTORY OF OTHER DISEASES OF THE RESPIRATORY SYSTEM: ICD-10-CM

## 2019-07-01 PROCEDURE — 99203 OFFICE O/P NEW LOW 30 MIN: CPT

## 2019-07-01 NOTE — PHYSICAL EXAM
[Normal Appearance] : normal appearance [Well Groomed] : well groomed [General Appearance - In No Acute Distress] : no acute distress [Normal Conjunctiva] : the conjunctiva exhibited no abnormalities [Elongated Uvula] : elongated uvula [Neck Appearance] : the appearance of the neck was normal [Heart Rate And Rhythm] : heart rate and rhythm were normal [Heart Sounds] : normal S1 and S2 [Murmurs] : no murmurs present [Arterial Pulses Normal] : the arterial pulses were normal [Respiration, Rhythm And Depth] : normal respiratory rhythm and effort [Exaggerated Use Of Accessory Muscles For Inspiration] : no accessory muscle use [Auscultation Breath Sounds / Voice Sounds] : lungs were clear to auscultation bilaterally [Abnormal Walk] : normal gait [Nail Clubbing] : no clubbing of the fingernails [Cyanosis, Localized] : no localized cyanosis [] : no rash [No Focal Deficits] : no focal deficits [Affect] : the affect was normal [Mood] : the mood was normal [FreeTextEntry2] : LLE non-pitting
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
## 2019-07-01 NOTE — REVIEW OF SYSTEMS
[As Noted in HPI] : as noted in HPI [Negative] : Sleep Disorder [Cough] : no cough [Hemoptysis] : no hemoptysis [Dyspnea] : no dyspnea [Chest Tightness] : no chest tightness [Pleuritic Pain] : no pleuritic pain [Wheezing] : no wheezing

## 2019-07-01 NOTE — DISCUSSION/SUMMARY
[FreeTextEntry1] : 47yo male in apparent good health presenting for hospital f/u for pulmonary emboli.  No lung functions done today.  His CTPA from hospital did show evidence of pulmonary emboli.  Blood work done today to r/o coagulopathy (Factor V Leiden and Prothrombin Gene Mutation).  C/w Eliquis for at least 6 months, at which time a repeat CTPA to be done.  In regards to the 1-cm RUL nodule, will opt to repeat imaging in three months.

## 2019-07-10 LAB
DNA PLOIDY SPEC FC-IMP: NORMAL
PTR INTERP: NORMAL

## 2019-07-12 ENCOUNTER — OTHER (OUTPATIENT)
Age: 49
End: 2019-07-12

## 2020-03-03 ENCOUNTER — FORM ENCOUNTER (OUTPATIENT)
Age: 50
End: 2020-03-03

## 2020-03-04 ENCOUNTER — OUTPATIENT (OUTPATIENT)
Dept: OUTPATIENT SERVICES | Facility: HOSPITAL | Age: 50
LOS: 1 days | End: 2020-03-04
Payer: COMMERCIAL

## 2020-03-04 ENCOUNTER — APPOINTMENT (OUTPATIENT)
Dept: CT IMAGING | Facility: IMAGING CENTER | Age: 50
End: 2020-03-04
Payer: COMMERCIAL

## 2020-03-04 DIAGNOSIS — I26.99 OTHER PULMONARY EMBOLISM WITHOUT ACUTE COR PULMONALE: ICD-10-CM

## 2020-03-04 PROCEDURE — 71275 CT ANGIOGRAPHY CHEST: CPT | Mod: 26

## 2020-03-04 PROCEDURE — 71275 CT ANGIOGRAPHY CHEST: CPT

## 2020-03-10 ENCOUNTER — APPOINTMENT (OUTPATIENT)
Dept: PULMONOLOGY | Facility: CLINIC | Age: 50
End: 2020-03-10
Payer: COMMERCIAL

## 2020-03-10 VITALS
BODY MASS INDEX: 27.83 KG/M2 | HEART RATE: 81 BPM | HEIGHT: 73 IN | DIASTOLIC BLOOD PRESSURE: 87 MMHG | WEIGHT: 210 LBS | OXYGEN SATURATION: 97 % | RESPIRATION RATE: 16 BRPM | SYSTOLIC BLOOD PRESSURE: 135 MMHG | TEMPERATURE: 96.9 F

## 2020-03-10 LAB — DEPRECATED D DIMER PPP IA-ACNC: <150 NG/ML DDU

## 2020-03-10 PROCEDURE — 99213 OFFICE O/P EST LOW 20 MIN: CPT
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
## 2020-03-10 NOTE — ASSESSMENT
[FreeTextEntry1] : a repeat CT scan showed his primary circulation shows clearing of all clots in the left lung and there is either a persistent small clot in the right lower lobe or a would have been caused by the prior clot. My plan is to have him undergo Dopplers of both lower extremities and blood work has been done today, d-dimer, antithrombin III, protein C and S. Previously he had a negative factor V Leiden and prothrombin mutation.\par If all of his blood studies are negative and his Dopplers are negative, I will stop his anticoagulant. I have asked him to see hematologist for confirmation that he does not have hypercoagulability.\par I will likely follow up with him with repeat Dopplers periodically for at least 6 months, perhaps a year.

## 2020-03-11 LAB
PROT C PPP CHRO-ACNC: 120 %
PROT S AG ACT/NOR PPP IA: 78 %

## 2020-03-25 ENCOUNTER — APPOINTMENT (OUTPATIENT)
Dept: ULTRASOUND IMAGING | Facility: IMAGING CENTER | Age: 50
End: 2020-03-25
Payer: COMMERCIAL

## 2020-03-25 ENCOUNTER — OUTPATIENT (OUTPATIENT)
Dept: OUTPATIENT SERVICES | Facility: HOSPITAL | Age: 50
LOS: 1 days | End: 2020-03-25
Payer: COMMERCIAL

## 2020-03-25 ENCOUNTER — RESULT REVIEW (OUTPATIENT)
Age: 50
End: 2020-03-25

## 2020-03-25 DIAGNOSIS — I26.99 OTHER PULMONARY EMBOLISM WITHOUT ACUTE COR PULMONALE: ICD-10-CM

## 2020-03-25 PROCEDURE — 93970 EXTREMITY STUDY: CPT | Mod: 26

## 2020-03-25 PROCEDURE — 93970 EXTREMITY STUDY: CPT

## 2020-04-25 ENCOUNTER — MESSAGE (OUTPATIENT)
Age: 50
End: 2020-04-25

## 2020-06-07 ENCOUNTER — OUTPATIENT (OUTPATIENT)
Dept: OUTPATIENT SERVICES | Facility: HOSPITAL | Age: 50
LOS: 1 days | Discharge: HOME | End: 2020-06-07

## 2020-06-07 DIAGNOSIS — D68.59 OTHER PRIMARY THROMBOPHILIA: ICD-10-CM

## 2020-06-10 ENCOUNTER — APPOINTMENT (OUTPATIENT)
Dept: HEMATOLOGY ONCOLOGY | Facility: CLINIC | Age: 50
End: 2020-06-10
Payer: COMMERCIAL

## 2020-06-10 ENCOUNTER — APPOINTMENT (OUTPATIENT)
Dept: HEMATOLOGY ONCOLOGY | Facility: CLINIC | Age: 50
End: 2020-06-10

## 2020-06-10 ENCOUNTER — TRANSCRIPTION ENCOUNTER (OUTPATIENT)
Age: 50
End: 2020-06-10

## 2020-06-10 DIAGNOSIS — I26.99 OTHER PULMONARY EMBOLISM W/OUT ACUTE COR PULMONALE: ICD-10-CM

## 2020-06-10 PROCEDURE — XXXXX: CPT

## 2020-06-12 RX ORDER — APIXABAN 5 MG/1
TABLET, FILM COATED ORAL
Refills: 0 | Status: DISCONTINUED | COMMUNITY
End: 2020-06-12

## 2020-06-12 RX ORDER — APIXABAN 5 MG/1
5 TABLET, FILM COATED ORAL
Qty: 60 | Refills: 6 | Status: DISCONTINUED | COMMUNITY
Start: 2019-07-12 | End: 2020-06-12

## 2020-06-30 ENCOUNTER — OUTPATIENT (OUTPATIENT)
Dept: OUTPATIENT SERVICES | Facility: HOSPITAL | Age: 50
LOS: 1 days | Discharge: HOME | End: 2020-06-30

## 2020-06-30 DIAGNOSIS — D68.59 OTHER PRIMARY THROMBOPHILIA: ICD-10-CM

## 2020-07-08 ENCOUNTER — APPOINTMENT (OUTPATIENT)
Dept: HEMATOLOGY ONCOLOGY | Facility: CLINIC | Age: 50
End: 2020-07-08

## 2020-07-19 PROBLEM — I26.99 PULMONARY EMBOLI: Status: ACTIVE | Noted: 2019-07-01

## 2020-07-21 NOTE — H&P ADULT - EXTERNAL EAR L
Patient tolerated lab draw via mediport without issues. Patient ambulated off unit to exam room for appointment with physician accompanied by Fco Doherty with belongings.
normal

## 2020-08-13 ENCOUNTER — APPOINTMENT (OUTPATIENT)
Dept: GASTROENTEROLOGY | Facility: HOSPITAL | Age: 50
End: 2020-08-13

## 2022-05-23 NOTE — PATIENT PROFILE ADULT - DOES PATIENT HAVE ADVANCE DIRECTIVE
Xanax changed to TID PRN. Orthostatic remains positive w/drop upon standing and dizziness at times. Plan for hip surgery at [facility] on 6/1/22. Patient having pain in LT leg, morphine IVP given per patient request.    Problem: PAIN - ADULT  Goal: Verbalizes/displays adequate comfort level or patient's stated pain goal  Description: INTERVENTIONS:  - Encourage pt to monitor pain and request assistance  - Assess pain using appropriate pain scale  - Administer analgesics based on type and severity of pain and evaluate response  - Implement non-pharmacological measures as appropriate and evaluate response  - Consider cultural and social influences on pain and pain management  - Manage/alleviate anxiety  - Utilize distraction and/or relaxation techniques  - Monitor for opioid side effects  - Notify MD/LIP if interventions unsuccessful or patient reports new pain  - Anticipate increased pain with activity and pre-medicate as appropriate  Outcome: Progressing     Problem: SAFETY ADULT - FALL  Goal: Free from fall injury  Description: INTERVENTIONS:  - Assess pt frequently for physical needs  - Identify cognitive and physical deficits and behaviors that affect risk of falls.   - [place] fall precautions as indicated by assessment.  - Educate pt/family on patient safety including physical limitations  - Instruct pt to call for assistance with activity based on assessment  - Modify environment to reduce risk of injury  - Provide assistive devices as appropriate  - Consider OT/PT consult to assist with strengthening/mobility  - Encourage toileting schedule  Outcome: Progressing     Problem: DISCHARGE PLANNING  Goal: Discharge to home or other facility with appropriate resources  Description: INTERVENTIONS:  - Identify barriers to discharge w/pt and caregiver  - Include patient/family/discharge partner in discharge planning  - Arrange for needed discharge resources and transportation as appropriate  - Identify discharge learning needs (meds, wound care, etc)  - Arrange for interpreters to assist at discharge as needed  - Consider post-discharge preferences of patient/family/discharge partner  - Complete POLST form as appropriate  - Assess patient's ability to be responsible for managing their own health  - Refer to Case Management Department for coordinating discharge planning if the patient needs post-hospital services based on physician/LIP order or complex needs related to functional status, cognitive ability or social support system  Outcome: Progressing     Problem: CARDIOVASCULAR - ADULT  Goal: Maintains optimal cardiac output and hemodynamic stability  Description: INTERVENTIONS:  - Monitor vital signs, rhythm, and trends  - Monitor for bleeding, hypotension and signs of decreased cardiac output  - Evaluate effectiveness of vasoactive medications to optimize hemodynamic stability  - Monitor arterial and/or venous puncture sites for bleeding and/or hematoma  - Assess quality of pulses, skin color and temperature  - Assess for signs of decreased coronary artery perfusion - ex.  Angina  - Evaluate fluid balance, assess for edema, trend weights  Outcome: Progressing  Goal: Absence of cardiac arrhythmias or at baseline  Description: INTERVENTIONS:  - Continuous cardiac monitoring, monitor vital signs, obtain 12 lead EKG if indicated  - Evaluate effectiveness of antiarrhythmic and heart rate control medications as ordered  - Initiate emergency measures for life threatening arrhythmias  - Monitor electrolytes and administer replacement therapy as ordered  Outcome: Progressing no

## 2022-06-20 ENCOUNTER — EMERGENCY (EMERGENCY)
Facility: HOSPITAL | Age: 52
LOS: 1 days | Discharge: HOME | End: 2022-06-20
Attending: STUDENT IN AN ORGANIZED HEALTH CARE EDUCATION/TRAINING PROGRAM | Admitting: STUDENT IN AN ORGANIZED HEALTH CARE EDUCATION/TRAINING PROGRAM
Payer: COMMERCIAL

## 2022-06-20 VITALS
TEMPERATURE: 98 F | HEIGHT: 72 IN | SYSTOLIC BLOOD PRESSURE: 155 MMHG | DIASTOLIC BLOOD PRESSURE: 96 MMHG | OXYGEN SATURATION: 99 % | HEART RATE: 108 BPM | RESPIRATION RATE: 18 BRPM

## 2022-06-20 VITALS
DIASTOLIC BLOOD PRESSURE: 78 MMHG | SYSTOLIC BLOOD PRESSURE: 136 MMHG | OXYGEN SATURATION: 100 % | HEART RATE: 83 BPM | TEMPERATURE: 98 F | RESPIRATION RATE: 18 BRPM

## 2022-06-20 LAB
ALBUMIN SERPL ELPH-MCNC: 4.2 G/DL — SIGNIFICANT CHANGE UP (ref 3.3–5)
ALP SERPL-CCNC: 84 U/L — SIGNIFICANT CHANGE UP (ref 40–120)
ALT FLD-CCNC: 50 U/L — HIGH (ref 4–41)
ANION GAP SERPL CALC-SCNC: 14 MMOL/L — SIGNIFICANT CHANGE UP (ref 7–14)
APPEARANCE UR: CLEAR — SIGNIFICANT CHANGE UP
AST SERPL-CCNC: 30 U/L — SIGNIFICANT CHANGE UP (ref 4–40)
BACTERIA # UR AUTO: NEGATIVE — SIGNIFICANT CHANGE UP
BASE EXCESS BLDV CALC-SCNC: -0.5 MMOL/L — SIGNIFICANT CHANGE UP (ref -2–3)
BASOPHILS # BLD AUTO: 0.05 K/UL — SIGNIFICANT CHANGE UP (ref 0–0.2)
BASOPHILS NFR BLD AUTO: 0.6 % — SIGNIFICANT CHANGE UP (ref 0–2)
BILIRUB SERPL-MCNC: 0.8 MG/DL — SIGNIFICANT CHANGE UP (ref 0.2–1.2)
BILIRUB UR-MCNC: ABNORMAL
BLOOD GAS VENOUS COMPREHENSIVE RESULT: SIGNIFICANT CHANGE UP
BUN SERPL-MCNC: 12 MG/DL — SIGNIFICANT CHANGE UP (ref 7–23)
CALCIUM SERPL-MCNC: 9.2 MG/DL — SIGNIFICANT CHANGE UP (ref 8.4–10.5)
CHLORIDE BLDV-SCNC: 103 MMOL/L — SIGNIFICANT CHANGE UP (ref 96–108)
CHLORIDE SERPL-SCNC: 102 MMOL/L — SIGNIFICANT CHANGE UP (ref 98–107)
CO2 BLDV-SCNC: 26.1 MMOL/L — HIGH (ref 22–26)
CO2 SERPL-SCNC: 21 MMOL/L — LOW (ref 22–31)
COLOR SPEC: YELLOW — SIGNIFICANT CHANGE UP
CREAT SERPL-MCNC: 0.9 MG/DL — SIGNIFICANT CHANGE UP (ref 0.5–1.3)
DIFF PNL FLD: NEGATIVE — SIGNIFICANT CHANGE UP
EGFR: 102 ML/MIN/1.73M2 — SIGNIFICANT CHANGE UP
EOSINOPHIL # BLD AUTO: 0.2 K/UL — SIGNIFICANT CHANGE UP (ref 0–0.5)
EOSINOPHIL NFR BLD AUTO: 2.6 % — SIGNIFICANT CHANGE UP (ref 0–6)
EPI CELLS # UR: 2 /HPF — SIGNIFICANT CHANGE UP (ref 0–5)
GAS PNL BLDV: 134 MMOL/L — LOW (ref 136–145)
GLUCOSE BLDV-MCNC: 103 MG/DL — HIGH (ref 70–99)
GLUCOSE SERPL-MCNC: 98 MG/DL — SIGNIFICANT CHANGE UP (ref 70–99)
GLUCOSE UR QL: NEGATIVE — SIGNIFICANT CHANGE UP
HCO3 BLDV-SCNC: 25 MMOL/L — SIGNIFICANT CHANGE UP (ref 22–29)
HCT VFR BLD CALC: 44.1 % — SIGNIFICANT CHANGE UP (ref 39–50)
HCT VFR BLDA CALC: 44 % — SIGNIFICANT CHANGE UP (ref 39–51)
HGB BLD CALC-MCNC: 14.7 G/DL — SIGNIFICANT CHANGE UP (ref 13–17)
HGB BLD-MCNC: 14.2 G/DL — SIGNIFICANT CHANGE UP (ref 13–17)
HYALINE CASTS # UR AUTO: 0 /LPF — SIGNIFICANT CHANGE UP (ref 0–7)
IANC: 4.68 K/UL — SIGNIFICANT CHANGE UP (ref 1.8–7.4)
IMM GRANULOCYTES NFR BLD AUTO: 0.3 % — SIGNIFICANT CHANGE UP (ref 0–1.5)
KETONES UR-MCNC: ABNORMAL
LACTATE BLDV-MCNC: 1.1 MMOL/L — SIGNIFICANT CHANGE UP (ref 0.5–2)
LEUKOCYTE ESTERASE UR-ACNC: NEGATIVE — SIGNIFICANT CHANGE UP
LYMPHOCYTES # BLD AUTO: 2 K/UL — SIGNIFICANT CHANGE UP (ref 1–3.3)
LYMPHOCYTES # BLD AUTO: 25.9 % — SIGNIFICANT CHANGE UP (ref 13–44)
MCHC RBC-ENTMCNC: 30.3 PG — SIGNIFICANT CHANGE UP (ref 27–34)
MCHC RBC-ENTMCNC: 32.2 GM/DL — SIGNIFICANT CHANGE UP (ref 32–36)
MCV RBC AUTO: 94 FL — SIGNIFICANT CHANGE UP (ref 80–100)
MONOCYTES # BLD AUTO: 0.78 K/UL — SIGNIFICANT CHANGE UP (ref 0–0.9)
MONOCYTES NFR BLD AUTO: 10.1 % — SIGNIFICANT CHANGE UP (ref 2–14)
NEUTROPHILS # BLD AUTO: 4.68 K/UL — SIGNIFICANT CHANGE UP (ref 1.8–7.4)
NEUTROPHILS NFR BLD AUTO: 60.5 % — SIGNIFICANT CHANGE UP (ref 43–77)
NITRITE UR-MCNC: NEGATIVE — SIGNIFICANT CHANGE UP
NRBC # BLD: 0 /100 WBCS — SIGNIFICANT CHANGE UP
NRBC # FLD: 0.02 K/UL — HIGH
PCO2 BLDV: 42 MMHG — SIGNIFICANT CHANGE UP (ref 42–55)
PH BLDV: 7.38 — SIGNIFICANT CHANGE UP (ref 7.32–7.43)
PH UR: 6 — SIGNIFICANT CHANGE UP (ref 5–8)
PLATELET # BLD AUTO: 275 K/UL — SIGNIFICANT CHANGE UP (ref 150–400)
PO2 BLDV: 47 MMHG — SIGNIFICANT CHANGE UP
POTASSIUM BLDV-SCNC: 4 MMOL/L — SIGNIFICANT CHANGE UP (ref 3.5–5.1)
POTASSIUM SERPL-MCNC: 4.2 MMOL/L — SIGNIFICANT CHANGE UP (ref 3.5–5.3)
POTASSIUM SERPL-SCNC: 4.2 MMOL/L — SIGNIFICANT CHANGE UP (ref 3.5–5.3)
PROT SERPL-MCNC: 8.4 G/DL — HIGH (ref 6–8.3)
PROT UR-MCNC: ABNORMAL
RBC # BLD: 4.69 M/UL — SIGNIFICANT CHANGE UP (ref 4.2–5.8)
RBC # FLD: 13.2 % — SIGNIFICANT CHANGE UP (ref 10.3–14.5)
RBC CASTS # UR COMP ASSIST: 4 /HPF — SIGNIFICANT CHANGE UP (ref 0–4)
SAO2 % BLDV: 76.3 % — SIGNIFICANT CHANGE UP
SODIUM SERPL-SCNC: 137 MMOL/L — SIGNIFICANT CHANGE UP (ref 135–145)
SP GR SPEC: 1.03 — SIGNIFICANT CHANGE UP (ref 1–1.05)
UROBILINOGEN FLD QL: ABNORMAL
WBC # BLD: 7.73 K/UL — SIGNIFICANT CHANGE UP (ref 3.8–10.5)
WBC # FLD AUTO: 7.73 K/UL — SIGNIFICANT CHANGE UP (ref 3.8–10.5)
WBC UR QL: 2 /HPF — SIGNIFICANT CHANGE UP (ref 0–5)

## 2022-06-20 PROCEDURE — 99285 EMERGENCY DEPT VISIT HI MDM: CPT

## 2022-06-20 PROCEDURE — 74177 CT ABD & PELVIS W/CONTRAST: CPT | Mod: 26,MA

## 2022-06-20 RX ORDER — SODIUM CHLORIDE 9 MG/ML
1000 INJECTION INTRAMUSCULAR; INTRAVENOUS; SUBCUTANEOUS ONCE
Refills: 0 | Status: COMPLETED | OUTPATIENT
Start: 2022-06-20 | End: 2022-06-20

## 2022-06-20 RX ORDER — KETOROLAC TROMETHAMINE 30 MG/ML
15 SYRINGE (ML) INJECTION ONCE
Refills: 0 | Status: DISCONTINUED | OUTPATIENT
Start: 2022-06-20 | End: 2022-06-20

## 2022-06-20 RX ADMIN — Medication 15 MILLIGRAM(S): at 17:50

## 2022-06-20 RX ADMIN — SODIUM CHLORIDE 1000 MILLILITER(S): 9 INJECTION INTRAMUSCULAR; INTRAVENOUS; SUBCUTANEOUS at 17:50

## 2022-06-20 NOTE — ED PROVIDER NOTE - CLINICAL SUMMARY MEDICAL DECISION MAKING FREE TEXT BOX
50 y/o male with a hx of kidney stones, PE(no longer on AC) presents to the ER c/o 3 days of LLQ pain. Pt is well appearing, NAD, will check labs, UA, CT, pain control, reassess.

## 2022-06-20 NOTE — ED ADULT NURSE NOTE - OBJECTIVE STATEMENT
pt received aox4, ambulatory at baseline w. pmh of kidney stones presents to the ED w. cc of LLQ abd pain for the past 3 days. Denies Cp, SOb, NVD, chills, fever, cough at the time. former smoker. 20g placed in RAC.

## 2022-06-20 NOTE — ED PROVIDER NOTE - NSFOLLOWUPINSTRUCTIONS_ED_ALL_ED_FT
You were seen in the emergency department for abdominal pain.  You were found to have acute diverticulitis, which is an infection of you colon.  You were started on antibiotics - take Augmentin 1 tab twice a day for 1 week.    Drink plenty of fluids.  You can take ibuprofen 600mg every 6 hours or Tylenol 650mg every 4 hours as needed for pain or fever.  Follow-up with your PMD in 1-2 weeks.  Follow-up with a gastroenterologist.  Return to the emergency department for any new or worsening symptoms.

## 2022-06-20 NOTE — ED PROVIDER NOTE - ATTENDING APP SHARED VISIT CONTRIBUTION OF CARE
I have personally performed a face to face medical and diagnostic evaluation of the patient. I have discussed with and reviewed the ACP's note and agree with the History, ROS, Physical Exam and MDM unless otherwise indicated. A brief summary of my personal evaluation and impression can be found below.    50yo M hx kidney stones, PE (no longer on AC) p/w 3 days LLQ pain intemrittent. Wors occ with body movement. No fever, chills, n/v/d, cp, sob, back pain, radiation to legs, dysuria or testitcular pain. Admits to darker urine recently. Unsure if feels like prior stones  VITALS: Initial triage and subsequent vitals have been reviewed by me.  Gen: Well appearing, NAD, alert, non-toxic  Head: NCAT  HEENT: MMM, normal conjunctiva, anicteric, neck supple  Lung: CTAB, no adventitious sounds  CV: RRR, no murmurs, 2+symmetric peripheral pulses  Abd: soft, mild llq ttp, NO CVAT, no rebound or guarding, no palpable masses  MSK: No edema, no visible deformities  Neuro: Moving all extremity grossly, following commands appropriately, fluid speech  Skin: Warm and dry, no evidence of rash  Psych: normal mood and affect  : Cremaster intact b/l and no testicular ttp  C/f poss kidney stone vs divertic. Well appearing, normal testicular exam do not suspect torsion. Will gte labs, ua CT and reassess

## 2022-06-20 NOTE — ED PROVIDER NOTE - PROGRESS NOTE DETAILS
KERRY Cuevas: pt resting comfortably NAD, pt pending CT results.  Pt signed out to Dr Rios follow up CT. Gabriel HALL: Pt signed out to me, ct showing acute diverticulitis.  Pt is feeling better, will start PO abx, dc home.

## 2022-06-20 NOTE — ED PROVIDER NOTE - NS ED ATTENDING STATEMENT MOD
This was a shared visit with the FARAZ. I reviewed and verified the documentation and independently performed the documented:

## 2022-06-20 NOTE — ED PROVIDER NOTE - OBJECTIVE STATEMENT
52 y/o male with a hx of kidney stones, PE(no longer on AC) presents to the ER c/o 3 days of LLQ pain.  Pt denies nausea, vomiting, fevers, chills, diarrhea, constipation, dysuria, frequency, hematuria.

## 2022-06-20 NOTE — ED PROVIDER NOTE - PATIENT PORTAL LINK FT
You can access the FollowMyHealth Patient Portal offered by [facility] by registering at the following website: [email or web address]. By joining [employer]’s FollowMyHealth portal, you will also be able to view your health information using other applications (apps) compatible with our system.

## 2022-06-21 RX ADMIN — Medication 1 TABLET(S): at 00:44

## 2022-06-22 LAB
CULTURE RESULTS: NO GROWTH — SIGNIFICANT CHANGE UP
SPECIMEN SOURCE: SIGNIFICANT CHANGE UP

## 2022-12-08 NOTE — PATIENT PROFILE ADULT - FUNCTIONAL SCREEN CURRENT LEVEL: SWALLOWING (IF SCORE 2 OR MORE FOR ANY ITEM, CONSULT REHAB SERVICES), MLM)
Requesting refill of Adderall 5mg    Last Rx written: 11/02/22  Last Rx dispensed (per PDMP): 11/14/22    Last office visit: 10/05/22  Upcoming office visit: 1/11/23  Requested time for patient to follow up: 3 months    Rx prepped, routed to provider for signature.        Requesting refill of Vyvanse 20mg    Last Rx written: 10/31/22  Last Rx dispensed (per PDMP): 11/04/22    Last office visit: 10/05/22  Upcoming office visit: 1/11/23  Requested time for patient to follow up: 3 months    Rx prepped, routed to provider for signature.           0 = swallows foods/liquids without difficulty

## 2023-08-12 ENCOUNTER — EMERGENCY (EMERGENCY)
Facility: HOSPITAL | Age: 53
LOS: 1 days | Discharge: HOME | End: 2023-08-12
Attending: EMERGENCY MEDICINE | Admitting: EMERGENCY MEDICINE
Payer: COMMERCIAL

## 2023-08-12 VITALS
RESPIRATION RATE: 18 BRPM | TEMPERATURE: 98 F | DIASTOLIC BLOOD PRESSURE: 97 MMHG | OXYGEN SATURATION: 100 % | HEART RATE: 93 BPM | SYSTOLIC BLOOD PRESSURE: 144 MMHG

## 2023-08-12 PROBLEM — I26.99 OTHER PULMONARY EMBOLISM WITHOUT ACUTE COR PULMONALE: Chronic | Status: ACTIVE | Noted: 2022-06-20

## 2023-08-12 PROCEDURE — 99284 EMERGENCY DEPT VISIT MOD MDM: CPT | Mod: 25

## 2023-08-12 PROCEDURE — 10060 I&D ABSCESS SIMPLE/SINGLE: CPT

## 2023-08-12 RX ORDER — LIDOCAINE HCL 20 MG/ML
20 VIAL (ML) INJECTION ONCE
Refills: 0 | Status: DISCONTINUED | OUTPATIENT
Start: 2023-08-12 | End: 2023-08-16

## 2023-08-12 NOTE — ED PROVIDER NOTE - PHYSICAL EXAMINATION
Well-appearing, well nourished, awake, alert, oriented to person, place, time/situation and in no apparent distress.    Airway patent. Neck supple.    Eyes without scleral injection. No jaundice.    Strong pulse.    Respirations unlabored.    Abdomen soft, non-tender, no guarding.    MSK: Spine appears normal, range of motion is not limited, no muscle or joint tenderness.    Alert and oriented, no gross motor or sensory deficits.    Skin: L upper back w/ indurated area ~5 cm wide w/ small area of mild erythema and tenderness around part of it.    No SI/HI.

## 2023-08-12 NOTE — ED PROVIDER NOTE - CLINICAL SUMMARY MEDICAL DECISION MAKING FREE TEXT BOX
Cesar: This was a sebaceous cyst that was uninfected.  Drained.  No indication for packing, as it was a small cyst.  Refer to general surgery for definitive excision of the cyst cavity.

## 2023-08-12 NOTE — ED PROVIDER NOTE - OBJECTIVE STATEMENT
Cesar: For a long time, the patient has had a cyst in his left upper back.  Recently, it's become painful and a little red around the edges.  No fever.  No trauma.

## 2023-08-12 NOTE — ED PROVIDER NOTE - PATIENT PORTAL LINK FT
You can access the FollowMyHealth Patient Portal offered by [facility] by registering at the following website: [email or web address]. By joining [employer]’s FollowMyHealth portal, you will also be able to view your health information using other applications (apps) compatible with our system.

## 2023-08-12 NOTE — ED PROVIDER NOTE - NSFOLLOWUPINSTRUCTIONS_ED_ALL_ED_FT
Keep dry for 2 days.    Change dressing daily.    Return if fever or pus.    Follow with General Surgery for definitive excision of the cyst cavity. Call [phone] and ask for an appointment at a convenient location.     Over-the-counter Tylenol 500 mg (1 or 2 every 6 hours) and/or Naproxen 500 mg (1 every 12 hours) for pain control.

## 2023-08-12 NOTE — ED ADULT TRIAGE NOTE - CHIEF COMPLAINT QUOTE
c/o quarter sized cyst to left shoulder x1 year, the past 2-3 days notices pain to the site. Denies any drainage, fevers, chills. Reports slight redness to the site.

## 2023-09-25 ENCOUNTER — EMERGENCY (EMERGENCY)
Facility: HOSPITAL | Age: 53
LOS: 1 days | Discharge: HOME | End: 2023-09-25
Attending: STUDENT IN AN ORGANIZED HEALTH CARE EDUCATION/TRAINING PROGRAM | Admitting: STUDENT IN AN ORGANIZED HEALTH CARE EDUCATION/TRAINING PROGRAM
Payer: COMMERCIAL

## 2023-09-25 VITALS
DIASTOLIC BLOOD PRESSURE: 90 MMHG | OXYGEN SATURATION: 100 % | HEART RATE: 92 BPM | TEMPERATURE: 98 F | RESPIRATION RATE: 18 BRPM | SYSTOLIC BLOOD PRESSURE: 140 MMHG

## 2023-09-25 PROCEDURE — 99284 EMERGENCY DEPT VISIT MOD MDM: CPT

## 2023-09-25 RX ORDER — IBUPROFEN 200 MG
600 TABLET ORAL ONCE
Refills: 0 | Status: COMPLETED | OUTPATIENT
Start: 2023-09-25 | End: 2023-09-25

## 2023-09-25 RX ADMIN — Medication 600 MILLIGRAM(S): at 23:34

## 2023-09-25 NOTE — ED PROVIDER NOTE - PATIENT PORTAL LINK FT
You can access the FollowMyHealth Patient Portal offered by [facility] by registering at the following website: [email or web address]. By joining [employer]’s FollowMyHealth portal, you will also be able to view your health information using other applications (apps) compatible with our system.

## 2023-09-25 NOTE — ED ADULT NURSE NOTE - NSFALLUNIVINTERV_ED_ALL_ED
Bed/Stretcher in lowest position, wheels locked, appropriate side rails in place/Call bell, personal items and telephone in reach/Instruct patient to call for assistance before getting out of bed/chair/stretcher/Non-slip footwear applied when patient is off stretcher/[place] to call system/Physically safe environment - no spills, clutter or unnecessary equipment/Purposeful proactive rounding/Room/bathroom lighting operational, light cord in reach

## 2023-09-25 NOTE — ED ADULT NURSE NOTE - OBJECTIVE STATEMENT
Patient received in intake. A&O3. Ambulatory. Came into ED with complaints of lue like symptoms x 1 week. Patient is asking for PCR covid test. Respirations even and unlabored. Denies SOB, chest pain, N/V/D, fevers. patient appears well. No signs of acute distress noted. Comfort and safety maintained. Stretcher in lowest position. Call bell within reach. Medicated per MD orders.

## 2023-09-25 NOTE — ED PROVIDER NOTE - NSFOLLOWUPINSTRUCTIONS_ED_ALL_ED_FT
COVID-19 (Coronavirus Disease 2019)    WHAT YOU NEED TO KNOW:    COVID-19 is the disease caused by a coronavirus first discovered in 2019. Coronaviruses generally cause upper respiratory (nose, throat, and lung) infections, such as a cold. The new virus can also cause serious lower respiratory conditions, such as pneumonia or acute respiratory distress syndrome (ARDS). The new virus can also affect many other organs, including the brain and heart. Blood vessels can also be affected, leading to blood clots. Anyone can develop serious problems from the new virus, but your risk is higher if you are 65 or older. A weak immune system, diabetes, or a heart or lung condition can also increase your risk. Your risk is also higher if you are a current or former cigarette smoker.    DISCHARGE INSTRUCTIONS:    If you think you or someone you know may be infected: Do the following to protect others:    If emergency care is needed, tell the  about the possible infection, or call ahead and tell the emergency department.    Call a healthcare provider for instructions if symptoms are mild. Anyone who may be infected should not arrive without calling first. The provider will need to protect staff members and other patients.    The person who may be infected needs to wear a face covering while getting medical care. This will help lower the risk of infecting others. Coverings are not used for anyone who is younger than 2 years, has breathing problems, or cannot remove it. The provider can give you instructions for anyone who cannot wear a covering.  Call your local emergency number (911 in the [place]) or an emergency department if:    You have trouble breathing or shortness of breath at rest.    You have chest pain or pressure that lasts longer than 5 minutes.    You become confused or hard to wake.    Your lips or face are blue.    You have a fever of 104°F (40°C) or higher.  Call your doctor if:    You do not have symptoms of COVID-19 but had close physical contact within 14 days with someone who tested positive.    You have questions or concerns about your condition or care.  Medicines: You may need any of the following:    Decongestants help reduce nasal congestion and help you breathe more easily. If you take decongestant pills, they may make you feel restless or cause problems with your sleep. Do not use decongestant sprays for more than a few days.    Cough suppressants help reduce coughing. Ask your healthcare provider which type of cough medicine is best for you.    Acetaminophen decreases pain and fever. It is available without a doctor's order. Ask how much to take and how often to take it. Follow directions. Read the labels of all other medicines you are using to see if they also contain acetaminophen, or ask your doctor or pharmacist. Acetaminophen can cause liver damage if not taken correctly. Do not use more than 4 grams (4,000 milligrams) total of acetaminophen in one day.    NSAIDs, such as ibuprofen, help decrease swelling, pain, and fever. NSAIDs can cause stomach bleeding or kidney problems in certain people. If you take blood thinner medicine, always ask your healthcare provider if NSAIDs are safe for you. Always read the medicine label and follow directions.    Take your medicine as directed. Contact your healthcare provider if you think your medicine is not helping or if you have side effects. Tell him or her if you are allergic to any medicine. Keep a list of the medicines, vitamins, and herbs you take. Include the amounts, and when and why you take them. Bring the list or the pill bottles to follow-up visits. Carry your medicine list with you in case of an emergency.  How the 2019 coronavirus spreads: The virus spreads quickly and easily. The virus can be passed starting 2 days before symptoms begin or before a positive test if symptoms never begin. The following are ways the virus is thought to spread, but more information may be coming:    Droplets are the main way all coronaviruses spread. The virus travels in droplets that form when a person talks, coughs, or sneezes. The droplets can also float in the air for minutes or hours. Infection happens when you breathe in the droplets or get them in your eyes or nose. Close personal contact with an infected person increases your risk for infection. This means being within 6 feet (2 meters) of the person for at least 15 minutes over 24 hours.    Person-to-person contact can spread the virus. For example, a person with the virus on his or her hands can spread it by shaking hands with someone.    The virus can stay on objects and surfaces for a short time. You may become infected by touching the object or surface and then touching your eyes or mouth.    An infected animal may be able to infect a person who touches it. This may happen at live markets or on a farm.  Help lower the risk for COVID-19: The best way to prevent infection is to avoid anyone who is infected, but this can be hard to do. An infected person can spread the virus before signs or symptoms begin, or even if signs or symptoms never develop. The following can help lower the risk for infection:  Prevent COVID-19 Infection    Wash your hands often throughout the day. Use soap and water. Rub your soapy hands together, lacing your fingers, for at least 20 seconds. Rinse with warm, running water. Dry your hands with a clean towel or paper towel. Use hand  that contains alcohol if soap and water are not available. Teach children how to wash their hands and use hand .  Handwashing      Cover sneezes and coughs. Turn your face away and cover your mouth and nose with a tissue. Throw the tissue away. Use the bend of your arm if a tissue is not available. Then wash your hands well with soap and water or use hand . Teach children how to cover a cough or sneeze.    Wear a face covering (mask) around anyone who does not live in your home. Use a disposable non-medical mask, or make a cloth covering with at least 2 layers. Cover your mouth and your nose. Securely fasten it under your chin and on the sides of your face. Do not wear a plastic face shield instead of a covering. Continue social distancing and washing your hands often. A face covering is not a substitute for social distancing safety measures.  How to Wear a Face Covering (Mask)      Follow worldwide, national, and local social distancing guidelines. Keep at least 6 feet (2 meters) between you and others. Also keep this distance from anyone who comes to your home, such as someone making a delivery.    Make a habit of not touching your face. If you get the virus on your hands, you can transfer it to your eyes, nose, or mouth and become infected. You can also transfer it to objects, surfaces, or people. Do not touch your eyes, nose, or mouth without washing your hands first.    Clean and disinfect high-touch surfaces and objects often. Use disinfecting wipes, or make a solution of 4 teaspoons of bleach in 1 quart (4 cups) of water. Clean and disinfect even if you think no one living in or coming to your home is infected with the virus.    Ask about vaccines you may need. The COVID-19 vaccine is a shot given to help prevent infection caused by the novel coronavirus. It is given to adults and children 16 years of age or older. Your healthcare provider can give you more information about when the vaccine may be available to you. Get the influenza (flu) vaccine as soon as recommended each year, usually starting in September or October. Get the pneumonia vaccine if recommended.  Follow social distancing guidelines: National and local social distancing rules vary. Rules may change over time as restrictions are lifted. Restrictions may return if an outbreak happens where you live. It is important to know and follow all current social distancing rules in your area. The following are general guidelines:    Stay home if you are sick or think you may have COVID-19. It is important to stay home if you are waiting for a testing appointment or for test results. Even if you do not have symptoms, you can pass the virus to others.    Limit trips out of your home. Have food, medicines, and other supplies delivered and left at your door or other area, if possible. Plan trips out of your home so you make the fewest stops possible to limit close personal contact. Keep track of places you go. This will help contact tracers notify others if you become infected.    Avoid close physical contact with anyone who does not live in your home. Do not shake hands with, hug, or kiss a person as a greeting. If you must use public transportation (such as a bus or subway), try to sit or stand away from others. Only allow necessary people into your home. Wear your face covering, and remind others to wear a face covering. Remind them to wash their hands when they arrive and before they leave. Do not let someone into your home or go to someone's home just to visit. Even if you both do not feel sick, the virus can pass from one of you to the other.    Avoid in-person gatherings and crowds. Gatherings or crowds of 10 or more individuals can cause the virus to spread. Avoid places such as gan, beaches, sporting events, and tourist attractions. For events such as parties, holiday meals, [employer] services, and conferences, attend virtually (on a computer), if possible.    Ask your healthcare provider for other ways to have appointments. Some providers offer phone, video, or other types of appointments. You may also be able to get prescriptions for a few months of your medicines at a time.    Stay safe if you must go out to work. Keep physical distance between you and other workers as much as possible. Follow your employer's rules so everyone stays safe.  If you have COVID-19 and are recovering at home, healthcare providers will give you specific instructions to follow. The following are general guidelines to remind you how to keep others safe until you are well:    Wash your hands often. Use soap and water as much as possible. Use hand  that contains alcohol if soap and water are not available. Dry your hands with a clean towel or paper towel. Do not share towels with anyone. If you use paper towels, throw them away in a lined trash can kept in your room or area. Use a covered trash can, if possible.    Do not go out of your home unless it is necessary. Ask someone who is not infected to go out for groceries or supplies, or have them delivered. Do not go to your healthcare provider's office without an appointment.    Only have close physical contact with a person giving direct care, or a baby or child you must care for. Family members and friends should not visit you. If possible, stay in a separate area or room of your home if you live with others. No one should go into the area or room except to give you care. You can visit with others by phone, video chat, e-mail, or similar systems.    Wear a face covering while others are near you. This can help prevent droplets from spreading the virus when you talk, sneeze, or cough. Put the covering on before anyone comes into your room or area. Remind the person to cover his or her nose and mouth before coming in to provide care for you.    Do not share items. Do not share dishes, towels, or other items with anyone. Items need to be washed after you use them.    Protect your baby. Some newborns have tested positive for the virus. It is not known if they became infected before or after birth. The highest risk is when a  has close contact with an infected person. If you are pregnant or breastfeeding, talk to your healthcare provider or obstetrician about any concerns you have. He or she will tell you when to bring your baby in for check-ups and vaccines. He or she will also tell you what to do if you think your baby was infected with the coronavirus. Wash your hands and put on a clean face covering before you breastfeed or care for your baby.    Do not handle live animals unless it is necessary. Some animals, including pets, have been infected with the new coronavirus. Ask someone who is not infected to take care of your pet until you are well. If you must care for a pet, wear a face covering. Wash your hands before and after you give care. Talk to your healthcare provider about how to keep a service animal safe, if needed.    Follow directions from your healthcare provider for being around others after you recover. It is not known if or for how long a recovered person can pass the virus to others. Your provider may give you instructions, such as continuing social distancing and wearing a face covering. He or she will tell you when it is okay to be around others again. This may be 10 to 20 days after symptoms started or you had a positive test. Most symptoms will also need to be gone. Your provider will give you more information.  Follow up with your doctor as directed: Write down your questions so you remember to ask them during your visits.    For more information:    Centers for Disease Control and Prevention  [address] [place] [address]  Phone: 1-798.562.4285  Web Address: http://www.cdc.gov

## 2023-09-25 NOTE — ED PROVIDER NOTE - NSCAREINITIATED _GEN_ER
Gary Tillman(Attending) Subsequent Stages Histo Method Verbiage: Using a similar technique to that described above, a thin layer of tissue was removed from all areas where tumor was visible on the previous stage.  The tissue was again oriented, mapped, dyed, and processed as above.

## 2023-09-25 NOTE — ED PROVIDER NOTE - CLINICAL SUMMARY MEDICAL DECISION MAKING FREE TEXT BOX
53-year-old with past medical back pain and cyst surgical removal in the back as well who is presenting to the emergency department with need for COVID test.  He is an employee and has had a cold for 7 days.  Describes a cold his cough and congestion and some runny nose.  No other symptoms, no fever, no myalgias, no malaise.  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  Patient has normal vital signs other than mild hypertension.  He is well-appearing no respiratory distress, resting comfortably on the stretcher.  Neurologically intact, ambulatory without ataxia.  Will give ibuprofen for his back pain, which is chronic in nature.  Obtain COVID screening testing.  Informed patient to follow-up portal, also his cell phone number was documented.o

## 2023-09-26 LAB
FLUAV AG NPH QL: SIGNIFICANT CHANGE UP
FLUBV AG NPH QL: SIGNIFICANT CHANGE UP
RSV RNA NPH QL NAA+NON-PROBE: SIGNIFICANT CHANGE UP
SARS-COV-2 RNA SPEC QL NAA+PROBE: SIGNIFICANT CHANGE UP

## 2024-01-01 ENCOUNTER — EMERGENCY (EMERGENCY)
Facility: HOSPITAL | Age: 54
LOS: 1 days | Discharge: HOME | End: 2024-01-01
Admitting: EMERGENCY MEDICINE
Payer: COMMERCIAL

## 2024-01-01 VITALS
RESPIRATION RATE: 18 BRPM | TEMPERATURE: 98 F | SYSTOLIC BLOOD PRESSURE: 152 MMHG | DIASTOLIC BLOOD PRESSURE: 91 MMHG | OXYGEN SATURATION: 100 % | HEART RATE: 100 BPM

## 2024-01-01 LAB
FLUAV AG NPH QL: SIGNIFICANT CHANGE UP
FLUAV AG NPH QL: SIGNIFICANT CHANGE UP
FLUBV AG NPH QL: SIGNIFICANT CHANGE UP
FLUBV AG NPH QL: SIGNIFICANT CHANGE UP
RSV RNA NPH QL NAA+NON-PROBE: SIGNIFICANT CHANGE UP
RSV RNA NPH QL NAA+NON-PROBE: SIGNIFICANT CHANGE UP
SARS-COV-2 RNA SPEC QL NAA+PROBE: DETECTED
SARS-COV-2 RNA SPEC QL NAA+PROBE: DETECTED

## 2024-01-01 PROCEDURE — 99283 EMERGENCY DEPT VISIT LOW MDM: CPT

## 2024-01-01 NOTE — ED ADULT NURSE NOTE - NSFALLUNIVINTERV_ED_ALL_ED
Bed/Stretcher in lowest position, wheels locked, appropriate side rails in place/Call bell, personal items and telephone in reach/Instruct patient to call for assistance before getting out of bed/chair/stretcher/Non-slip footwear applied when patient is off stretcher/[place] to call system/Physically safe environment - no spills, clutter or unnecessary equipment/Purposeful proactive rounding/Room/bathroom lighting operational, light cord in reach Bed/Stretcher in lowest position, wheels locked, appropriate side rails in place/Call bell, personal items and telephone in reach/Instruct patient to call for assistance before getting out of bed/chair/stretcher/Non-slip footwear applied when patient is off stretcher/[place] to call system/Physically safe environment - no spills, clutter or unnecessary equipment/Purposeful proactive rounding/Room/bathroom lighting operational, light cord in reach

## 2024-01-01 NOTE — ED ADULT NURSE NOTE - OBJECTIVE STATEMENT
received pt to intake. A&OX4 RR even unlabored completing full sentences. pt c/o body aches and congestion. pt requesting covid swab. No significant past medical hx. pt well appearing on assessment. swab collected and sent per order set. safety measures maintained throughout.

## 2024-01-01 NOTE — ED PROVIDER NOTE - PATIENT PORTAL LINK FT
You can access the FollowMyHealth Patient Portal offered by [facility] by registering at the following website: [email or web address]. By joining [employer]’s FollowMyHealth portal, you will also be able to view your health information using other applications (apps) compatible with our system. You can access the FollowMyHealth Patient Portal offered by [facility] by registering at the following website: [email or web address]. By joining [employer]’s FollowMyHealth portal, you will also be able to view your health information using other applications (apps) compatible with our system.

## 2024-01-01 NOTE — ED PROVIDER NOTE - CLINICAL SUMMARY MEDICAL DECISION MAKING FREE TEXT BOX
53-year-old male no reported past medical history presents with bodyaches congestion pain x 1 day.  Like to have COVID swab.  Patient denies chest pain shortness of breath fevers chills cough abdominal pain nausea vomiting diarrhea headache dizziness.    plan  - covid swab

## 2024-01-01 NOTE — ED PROVIDER NOTE - OBJECTIVE STATEMENT
53-year-old male presents with bodyaches congestion pain x 1 day.  Like to have COVID swab.  Patient denies chest pain shortness of breath fevers chills cough abdominal pain nausea vomiting diarrhea headache dizziness.

## 2024-03-09 ENCOUNTER — EMERGENCY (EMERGENCY)
Facility: HOSPITAL | Age: 54
LOS: 1 days | Discharge: HOME | End: 2024-03-09
Attending: EMERGENCY MEDICINE | Admitting: EMERGENCY MEDICINE
Payer: COMMERCIAL

## 2024-03-09 VITALS
TEMPERATURE: 98 F | WEIGHT: 259.7 LBS | HEART RATE: 99 BPM | RESPIRATION RATE: 16 BRPM | OXYGEN SATURATION: 100 % | SYSTOLIC BLOOD PRESSURE: 129 MMHG | DIASTOLIC BLOOD PRESSURE: 82 MMHG

## 2024-03-09 VITALS
DIASTOLIC BLOOD PRESSURE: 85 MMHG | TEMPERATURE: 98 F | OXYGEN SATURATION: 100 % | HEART RATE: 120 BPM | SYSTOLIC BLOOD PRESSURE: 136 MMHG | RESPIRATION RATE: 15 BRPM

## 2024-03-09 LAB
A1C WITH ESTIMATED AVERAGE GLUCOSE RESULT: 11 % — HIGH (ref 4–5.6)
ALBUMIN SERPL ELPH-MCNC: 4.1 G/DL — SIGNIFICANT CHANGE UP (ref 3.3–5)
ALP SERPL-CCNC: 112 U/L — SIGNIFICANT CHANGE UP (ref 40–120)
ALT FLD-CCNC: 42 U/L — HIGH (ref 4–41)
ANION GAP SERPL CALC-SCNC: 17 MMOL/L — HIGH (ref 7–14)
APPEARANCE UR: CLEAR — SIGNIFICANT CHANGE UP
AST SERPL-CCNC: 24 U/L — SIGNIFICANT CHANGE UP (ref 4–40)
B-OH-BUTYR SERPL-SCNC: 0.2 MMOL/L — SIGNIFICANT CHANGE UP (ref 0–0.4)
BASOPHILS # BLD AUTO: 0.04 K/UL — SIGNIFICANT CHANGE UP (ref 0–0.2)
BASOPHILS NFR BLD AUTO: 0.4 % — SIGNIFICANT CHANGE UP (ref 0–2)
BILIRUB SERPL-MCNC: 0.4 MG/DL — SIGNIFICANT CHANGE UP (ref 0.2–1.2)
BILIRUB UR-MCNC: NEGATIVE — SIGNIFICANT CHANGE UP
BUN SERPL-MCNC: 19 MG/DL — SIGNIFICANT CHANGE UP (ref 7–23)
CALCIUM SERPL-MCNC: 9.4 MG/DL — SIGNIFICANT CHANGE UP (ref 8.4–10.5)
CHLORIDE SERPL-SCNC: 95 MMOL/L — LOW (ref 98–107)
CO2 SERPL-SCNC: 19 MMOL/L — LOW (ref 22–31)
COLOR SPEC: YELLOW — SIGNIFICANT CHANGE UP
CREAT SERPL-MCNC: 0.99 MG/DL — SIGNIFICANT CHANGE UP (ref 0.5–1.3)
DIFF PNL FLD: NEGATIVE — SIGNIFICANT CHANGE UP
EGFR: 90 ML/MIN/1.73M2 — SIGNIFICANT CHANGE UP
EOSINOPHIL # BLD AUTO: 0.13 K/UL — SIGNIFICANT CHANGE UP (ref 0–0.5)
EOSINOPHIL NFR BLD AUTO: 1.5 % — SIGNIFICANT CHANGE UP (ref 0–6)
ESTIMATED AVERAGE GLUCOSE: 269 — SIGNIFICANT CHANGE UP
GLUCOSE SERPL-MCNC: 503 MG/DL — CRITICAL HIGH (ref 70–99)
GLUCOSE UR QL: >=1000 MG/DL
HCT VFR BLD CALC: 41.5 % — SIGNIFICANT CHANGE UP (ref 39–50)
HGB BLD-MCNC: 13.9 G/DL — SIGNIFICANT CHANGE UP (ref 13–17)
IANC: 5.55 K/UL — SIGNIFICANT CHANGE UP (ref 1.8–7.4)
IMM GRANULOCYTES NFR BLD AUTO: 0.4 % — SIGNIFICANT CHANGE UP (ref 0–0.9)
KETONES UR-MCNC: NEGATIVE MG/DL — SIGNIFICANT CHANGE UP
LEUKOCYTE ESTERASE UR-ACNC: NEGATIVE — SIGNIFICANT CHANGE UP
LYMPHOCYTES # BLD AUTO: 2.3 K/UL — SIGNIFICANT CHANGE UP (ref 1–3.3)
LYMPHOCYTES # BLD AUTO: 25.8 % — SIGNIFICANT CHANGE UP (ref 13–44)
MAGNESIUM SERPL-MCNC: 2.1 MG/DL — SIGNIFICANT CHANGE UP (ref 1.6–2.6)
MCHC RBC-ENTMCNC: 30.9 PG — SIGNIFICANT CHANGE UP (ref 27–34)
MCHC RBC-ENTMCNC: 33.5 GM/DL — SIGNIFICANT CHANGE UP (ref 32–36)
MCV RBC AUTO: 92.2 FL — SIGNIFICANT CHANGE UP (ref 80–100)
MONOCYTES # BLD AUTO: 0.87 K/UL — SIGNIFICANT CHANGE UP (ref 0–0.9)
MONOCYTES NFR BLD AUTO: 9.7 % — SIGNIFICANT CHANGE UP (ref 2–14)
NEUTROPHILS # BLD AUTO: 5.55 K/UL — SIGNIFICANT CHANGE UP (ref 1.8–7.4)
NEUTROPHILS NFR BLD AUTO: 62.2 % — SIGNIFICANT CHANGE UP (ref 43–77)
NITRITE UR-MCNC: NEGATIVE — SIGNIFICANT CHANGE UP
NRBC # BLD: 0 /100 WBCS — SIGNIFICANT CHANGE UP (ref 0–0)
NRBC # FLD: 0 K/UL — SIGNIFICANT CHANGE UP (ref 0–0)
PH UR: 6 — SIGNIFICANT CHANGE UP (ref 5–8)
PHOSPHATE SERPL-MCNC: 3.1 MG/DL — SIGNIFICANT CHANGE UP (ref 2.5–4.5)
PLATELET # BLD AUTO: 290 K/UL — SIGNIFICANT CHANGE UP (ref 150–400)
POTASSIUM SERPL-MCNC: 4.4 MMOL/L — SIGNIFICANT CHANGE UP (ref 3.5–5.3)
POTASSIUM SERPL-SCNC: 4.4 MMOL/L — SIGNIFICANT CHANGE UP (ref 3.5–5.3)
PROT SERPL-MCNC: 8.4 G/DL — HIGH (ref 6–8.3)
PROT UR-MCNC: NEGATIVE MG/DL — SIGNIFICANT CHANGE UP
RBC # BLD: 4.5 M/UL — SIGNIFICANT CHANGE UP (ref 4.2–5.8)
RBC # FLD: 13.2 % — SIGNIFICANT CHANGE UP (ref 10.3–14.5)
SODIUM SERPL-SCNC: 131 MMOL/L — LOW (ref 135–145)
SP GR SPEC: 1.04 — HIGH (ref 1–1.03)
UROBILINOGEN FLD QL: 0.2 MG/DL — SIGNIFICANT CHANGE UP (ref 0.2–1)
WBC # BLD: 8.93 K/UL — SIGNIFICANT CHANGE UP (ref 3.8–10.5)
WBC # FLD AUTO: 8.93 K/UL — SIGNIFICANT CHANGE UP (ref 3.8–10.5)

## 2024-03-09 PROCEDURE — 93010 ELECTROCARDIOGRAM REPORT: CPT

## 2024-03-09 PROCEDURE — 99284 EMERGENCY DEPT VISIT MOD MDM: CPT

## 2024-03-09 RX ORDER — SODIUM CHLORIDE 9 MG/ML
1000 INJECTION INTRAMUSCULAR; INTRAVENOUS; SUBCUTANEOUS ONCE
Refills: 0 | Status: COMPLETED | OUTPATIENT
Start: 2024-03-09 | End: 2024-03-09

## 2024-03-09 RX ORDER — METFORMIN HYDROCHLORIDE 850 MG/1
1 TABLET ORAL
Qty: 60 | Refills: 0
Start: 2024-03-09 | End: 2024-04-07

## 2024-03-09 RX ADMIN — SODIUM CHLORIDE 1000 MILLILITER(S): 9 INJECTION INTRAMUSCULAR; INTRAVENOUS; SUBCUTANEOUS at 02:54

## 2024-03-09 NOTE — ED PROVIDER NOTE - CLINICAL SUMMARY MEDICAL DECISION MAKING FREE TEXT BOX
MDM/Summary/DDx (includes but is not limited to):   53-year-old with no past medical history coming in with hyperglycemia, found to have a sugar around 400.  Polydipsia polyuria.  Less likely to be infectious in nature, even if it is, will initiate CBC CMP A1c VBG beta, urine, no imaging, and likely dispo to the CDU for new diabetic training/endocrine    Triage note reviewed. VS reviewed. EKG reviewed and documented in "RESULTS" section, if possible at given time.     DDx in MDM includes the most likely ddx, but is not limited to solely what is listed. Clinical course may alter/deviate from the above plan. When possible, progress notes written, as needed, and are included in "PROGRESS NOTE" section below.       Medical, family, and social determinants of health reviewed and discussed w/ pt/family/caretaker, when allowable, and is incorporated into note above, whenever possible.

## 2024-03-09 NOTE — ED PROVIDER NOTE - OBJECTIVE STATEMENT
HPI & ROS: 53-year-old male with no past medical history coming in with hyperglycemia.  Patient has had polydipsia, polyuria.  No abdominal pain.  No recent fevers chills nausea vomiting.  Patient was having intermittent blurry vision for a time, and then was told by his friends to get his blood pressure and sugar checked.  Sugar was around 400. HPI & ROS: 53-year-old male with no past medical history coming in with hyperglycemia.  Patient has had polydipsia, polyuria.  No abdominal pain.  No recent fevers chills nausea vomiting.  Patient was having intermittent blurry vision for a time, and then was told by his friends to get his blood pressure and sugar checked.  Sugar was around 400.    Attendinyo male presents with hyperglycemia.  came to work today and had blurry vision.  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  checked glucose and it was over 400.  does not have a PCP.

## 2024-03-09 NOTE — ED ADULT NURSE NOTE - OBJECTIVE STATEMENT
Patient received in ED spot 29. A&Ox4 and ambulatory. [facility] staff member. Presents to ED c/o hyperglycemia, polydipsia, polyuria and blurry vision. Pt denies known diabetes history. Well appearing sitting up in stretcher HOB elevated. Airway patent, speaking in full and complete sentences. Denies CP, SOB, nausea, vomiting, headache, lightheadedness, dizziness, fever or chills. Respirations even and unlabored with equal chest rise. No acute distress noted. Placed on bedside cardiac monitor sinus tachy, HR is 107 at this time. Fingerstick obtained and in flowsheet. IV 20g placed to right AC, labs drawn and sent as ordered. Urine sent as ordered. Fluids running, see MAR. Safety maintained. Awaiting further orders.

## 2024-03-09 NOTE — ED PROVIDER NOTE - NSPTACCESSSVCSAPPTDETAILS_ED_ALL_ED_FT
Patient needs primary care as soon as possible, and extremely urgent endocrinology for new onset diabetes Patient needs primary care as soon as possible, and extremely urgent endocrinology for new onset diabetes. 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

## 2024-03-09 NOTE — ED PROVIDER NOTE - PHYSICAL EXAMINATION
Exam as stated below:   CONSTITUTIONAL: In NAD.   SKIN: Warm dry. No rashes noted.   HEAD: NCAT.   EYES: No scleral icterus. Conjunctiva pink.  ENT: Posterior pharynx with no erythema.  NECK: No ttp.    CARD: RRR. No murmurs.   RESP: Clear to ausculation b/l. No Crackles noted. No Wheezing noted.  ABD: Soft. Non-tender. Not distended.   MSK: No pedal edema. No calf tenderness.   NEURO: UE/LE grossly intact. Motor UE/LE sensation grossly intact. CN II-XII grossly intact.   PSYCH: Cooperative, appropriate.

## 2024-03-09 NOTE — ED ADULT TRIAGE NOTE - NS ED TRIAGE AVPU SCALE
Alert-The patient is alert, awake and responds to voice. The patient is oriented to time, place, and person. The triage nurse is able to obtain subjective information. good balance

## 2024-03-09 NOTE — ED PROVIDER NOTE - PATIENT PORTAL LINK FT
You can access the FollowMyHealth Patient Portal offered by [facility] by registering at the following website: [email or web address]. By joining [employer]’s FollowMyHealth portal, you will also be able to view your health information using other applications (apps) compatible with our system.

## 2024-03-09 NOTE — ED PROVIDER NOTE - NSFOLLOWUPINSTRUCTIONS_ED_ALL_ED_FT
- I have sent the medication to your pharmacy.  You take it once per day.  The pills come in 500 mg.  for the first 7 days, you will take 1 pill.  Then in that second week you will add a pill for 2 total, taken in the morning.     -  [facility] will be contacting you or at least the discharge center will be contacting you to make an appointment with both a primary care physician and an endocrinologist.  please see the primary care physician as soon as possible, if you happen to see the primary care physician before the endocrinologist, this is okay, as a primary care physician can start you on your journey with your diabetes in terms of insulin, education, etc.    - Your testing/exams was/were reassuring that dangerous emergencies/conditions are less likely to be occurring or to have occurred.    - Take all medications, if given/sent to pharmacy, and as, directed.    - If you had labs or imaging done, you were given copies of all labs and/or imaging results from your er visit--please take them with you to your follow up appointments.  - If needed, call patient access services at [phone] to find a primary care physician (PCP). Call this number to follow up with a specialty service, such as the spine clinic. If you need this, call and say you were recently in the emergency department and you are calling, per my orders.   - Make sure you do not require a primary care physician's referral if you make a specialty clinic appointment directly. Some insurance requires you to see your PCP, get a referral, then make a specialty appointment.

## 2024-03-09 NOTE — ED ADULT TRIAGE NOTE - CHIEF COMPLAINT QUOTE
presents C/O polyuria Polydipsia hyperglycemia. No complaints of chest pain, headache, nausea, dizziness, vomiting  SOB, fever, chills verbalized..  Phx PE(2019) presents C/O polyuria Polydipsia hyperglycemia. No complaints of chest pain, headache, nausea, dizziness, vomiting  SOB, fever, chills verbalized. Phx PE(2019)

## 2024-03-09 NOTE — ED ADULT NURSE NOTE - CHIEF COMPLAINT QUOTE
presents C/O polyuria Polydipsia hyperglycemia. No complaints of chest pain, headache, nausea, dizziness, vomiting  SOB, fever, chills verbalized..  Phx PE(2019)

## 2024-04-02 ENCOUNTER — APPOINTMENT (OUTPATIENT)
Dept: INTERNAL MEDICINE | Facility: CLINIC | Age: 54
End: 2024-04-02
Payer: COMMERCIAL

## 2024-04-02 ENCOUNTER — NON-APPOINTMENT (OUTPATIENT)
Age: 54
End: 2024-04-02

## 2024-04-02 VITALS
SYSTOLIC BLOOD PRESSURE: 130 MMHG | BODY MASS INDEX: 34.19 KG/M2 | WEIGHT: 258 LBS | HEART RATE: 84 BPM | HEIGHT: 73 IN | DIASTOLIC BLOOD PRESSURE: 81 MMHG | TEMPERATURE: 98.6 F | OXYGEN SATURATION: 98 %

## 2024-04-02 DIAGNOSIS — Z12.12 ENCOUNTER FOR SCREENING FOR MALIGNANT NEOPLASM OF COLON: ICD-10-CM

## 2024-04-02 DIAGNOSIS — Z12.11 ENCOUNTER FOR SCREENING FOR MALIGNANT NEOPLASM OF COLON: ICD-10-CM

## 2024-04-02 DIAGNOSIS — Z00.00 ENCOUNTER FOR GENERAL ADULT MEDICAL EXAMINATION W/OUT ABNORMAL FINDINGS: ICD-10-CM

## 2024-04-02 DIAGNOSIS — Z83.3 FAMILY HISTORY OF DIABETES MELLITUS: ICD-10-CM

## 2024-04-02 PROCEDURE — 36415 COLL VENOUS BLD VENIPUNCTURE: CPT

## 2024-04-02 PROCEDURE — 99386 PREV VISIT NEW AGE 40-64: CPT | Mod: 25

## 2024-04-02 PROCEDURE — 93000 ELECTROCARDIOGRAM COMPLETE: CPT

## 2024-04-02 RX ORDER — SODIUM SULFATE, POTASSIUM SULFATE, MAGNESIUM SULFATE 17.5; 3.13; 1.6 G/ML; G/ML; G/ML
17.5-3.13-1.6 SOLUTION, CONCENTRATE ORAL
Qty: 1 | Refills: 0 | Status: COMPLETED | COMMUNITY
Start: 2020-06-22 | End: 2024-04-02

## 2024-04-02 NOTE — PHYSICAL EXAM
[Normal Sclera/Conjunctiva] : normal sclera/conjunctiva [No Acute Distress] : no acute distress [EOMI] : extraocular movements intact [Supple] : supple [No Lymphadenopathy] : no lymphadenopathy [No Respiratory Distress] : no respiratory distress  [No Accessory Muscle Use] : no accessory muscle use [Clear to Auscultation] : lungs were clear to auscultation bilaterally [Regular Rhythm] : with a regular rhythm [Normal Rate] : normal rate  [No Edema] : there was no peripheral edema [Normal S1, S2] : normal S1 and S2 [No Extremity Clubbing/Cyanosis] : no extremity clubbing/cyanosis [Non Tender] : non-tender [Soft] : abdomen soft [Non-distended] : non-distended [Normal Affect] : the affect was normal [Normal Bowel Sounds] : normal bowel sounds [Normal Insight/Judgement] : insight and judgment were intact

## 2024-04-02 NOTE — HEALTH RISK ASSESSMENT
[No] : In the past 12 months have you used drugs other than those required for medical reasons? No [0] : 2) Feeling down, depressed, or hopeless: Not at all (0) [PHQ-2 Negative - No further assessment needed] : PHQ-2 Negative - No further assessment needed [Employed] : employed [Single] : single [# Of Children ___] : has [unfilled] children [Feels Safe at Home] : Feels safe at home [Fully functional (bathing, dressing, toileting, transferring, walking, feeding)] : Fully functional (bathing, dressing, toileting, transferring, walking, feeding) [Fully functional (using the telephone, shopping, preparing meals, housekeeping, doing laundry, using] : Fully functional and needs no help or supervision to perform IADLs (using the telephone, shopping, preparing meals, housekeeping, doing laundry, using transportation, managing medications and managing finances) [Former] : Former [< 15 Years] : < 15 Years [10-14] : 10-14 [[id number]] : 0 [ColonoscopyDate] : due  [de-identified] : cousin and cousin's wife  [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

## 2024-04-02 NOTE — ASSESSMENT
[FreeTextEntry1] : Health Care Maintenance - well visit - routine labs ordered - depression screen negative - ekg NSR - colonoscopy- never, due, GI referral given  - flu vaccine fall 2023 - covid vaccines- s/p 2 doses  - tdap- unsure when last received, advised to check records and obtain if 10+ years since last dose - shingles vaccine- recommend  - advised to get annual eye exams with optometry/ophthalmology, skin exams with dermatology, and dental exams  DM2 -new diagnosis 3/9/2024 with A1C 11.0 -on metformin 500mg x2 qd -f/u a1c and ACR -pending endo appt 4/9 wth NP Vee -pending ophthalmology appt Dr. Yari Pratt 4/19 -podiatry referral given -advised to see ophthalmology and podiatry annually  Hx of PE 2019 -reports coagulation workup with hematology was negative and he was DC'ed off eliquis after 6 months   RTC in 3 months

## 2024-04-02 NOTE — HISTORY OF PRESENT ILLNESS
[FreeTextEntry1] : cpe/est care [de-identified] : DUANE KELLER is a 53 year M who presents for CPE/est care PMHx DM2, PE (2019)   Recent [facility] ED visit on 3/9 with blurry vision and diagnosed with DM2, A1C 11.0. He was started on metformin 1000mg qd. Reports he has made significant changes to his eating habits. Checking BS BID and sugars ranging between 100-130s.  Otherwise, feels well

## 2024-04-03 DIAGNOSIS — R79.89 OTHER SPECIFIED ABNORMAL FINDINGS OF BLOOD CHEMISTRY: ICD-10-CM

## 2024-04-03 LAB
25(OH)D3 SERPL-MCNC: 11.5 NG/ML
ALBUMIN SERPL ELPH-MCNC: 4.2 G/DL
ALP BLD-CCNC: 76 U/L
ALT SERPL-CCNC: 68 U/L
ANION GAP SERPL CALC-SCNC: 12 MMOL/L
AST SERPL-CCNC: 30 U/L
BILIRUB SERPL-MCNC: 0.3 MG/DL
BUN SERPL-MCNC: 14 MG/DL
CALCIUM SERPL-MCNC: 9.5 MG/DL
CHLORIDE SERPL-SCNC: 105 MMOL/L
CHOLEST SERPL-MCNC: 200 MG/DL
CO2 SERPL-SCNC: 22 MMOL/L
CREAT SERPL-MCNC: 0.94 MG/DL
CREAT SPEC-SCNC: 171 MG/DL
EGFR: 96 ML/MIN/1.73M2
ESTIMATED AVERAGE GLUCOSE: 229 MG/DL
GLUCOSE SERPL-MCNC: 109 MG/DL
HBA1C MFR BLD HPLC: 9.6 %
HCT VFR BLD CALC: 41.1 %
HDLC SERPL-MCNC: 34 MG/DL
HGB BLD-MCNC: 13.2 G/DL
LDLC SERPL CALC-MCNC: 147 MG/DL
MCHC RBC-ENTMCNC: 30.6 PG
MCHC RBC-ENTMCNC: 32.1 GM/DL
MCV RBC AUTO: 95.4 FL
MICROALBUMIN 24H UR DL<=1MG/L-MCNC: <1.2 MG/DL
MICROALBUMIN/CREAT 24H UR-RTO: NORMAL MG/G
NONHDLC SERPL-MCNC: 166 MG/DL
PLATELET # BLD AUTO: 297 K/UL
POTASSIUM SERPL-SCNC: 5 MMOL/L
PROT SERPL-MCNC: 7.7 G/DL
PSA SERPL-MCNC: 0.66 NG/ML
RBC # BLD: 4.31 M/UL
RBC # FLD: 13 %
SODIUM SERPL-SCNC: 139 MMOL/L
TRIGL SERPL-MCNC: 107 MG/DL
TSH SERPL-ACNC: 0.88 UIU/ML
WBC # FLD AUTO: 4.6 K/UL

## 2024-04-03 RX ORDER — ERGOCALCIFEROL 1.25 MG/1
1.25 MG CAPSULE, LIQUID FILLED ORAL
Qty: 8 | Refills: 0 | Status: ACTIVE | COMMUNITY
Start: 2024-04-03 | End: 1900-01-01

## 2024-04-09 ENCOUNTER — APPOINTMENT (OUTPATIENT)
Dept: ENDOCRINOLOGY | Facility: CLINIC | Age: 54
End: 2024-04-09
Payer: COMMERCIAL

## 2024-04-09 VITALS
HEART RATE: 105 BPM | WEIGHT: 258 LBS | BODY MASS INDEX: 34.19 KG/M2 | OXYGEN SATURATION: 97 % | DIASTOLIC BLOOD PRESSURE: 80 MMHG | HEIGHT: 73 IN | SYSTOLIC BLOOD PRESSURE: 140 MMHG

## 2024-04-09 DIAGNOSIS — E11.65 TYPE 2 DIABETES MELLITUS WITH HYPERGLYCEMIA: ICD-10-CM

## 2024-04-09 PROCEDURE — G2211 COMPLEX E/M VISIT ADD ON: CPT

## 2024-04-09 PROCEDURE — 99205 OFFICE O/P NEW HI 60 MIN: CPT

## 2024-04-19 ENCOUNTER — APPOINTMENT (OUTPATIENT)
Dept: OPHTHALMOLOGY | Facility: CLINIC | Age: 54
End: 2024-04-19

## 2024-05-03 ENCOUNTER — RX RENEWAL (OUTPATIENT)
Age: 54
End: 2024-05-03

## 2024-05-20 RX ORDER — METFORMIN HYDROCHLORIDE 500 MG/1
500 TABLET, COATED ORAL
Qty: 180 | Refills: 0 | Status: ACTIVE | COMMUNITY
Start: 2024-04-02 | End: 1900-01-01

## 2024-05-28 ENCOUNTER — APPOINTMENT (OUTPATIENT)
Dept: OPHTHALMOLOGY | Facility: CLINIC | Age: 54
End: 2024-05-28
Payer: COMMERCIAL

## 2024-05-28 ENCOUNTER — NON-APPOINTMENT (OUTPATIENT)
Age: 54
End: 2024-05-28

## 2024-05-28 PROCEDURE — 92020 GONIOSCOPY: CPT

## 2024-05-28 PROCEDURE — 92004 COMPRE OPH EXAM NEW PT 1/>: CPT

## 2024-05-28 PROCEDURE — 92132 CPTRZD OPH DX IMG ANT SGM: CPT

## 2024-05-28 PROCEDURE — 92250 FUNDUS PHOTOGRAPHY W/I&R: CPT

## 2024-06-05 ENCOUNTER — NON-APPOINTMENT (OUTPATIENT)
Age: 54
End: 2024-06-05

## 2024-06-05 ENCOUNTER — APPOINTMENT (OUTPATIENT)
Dept: OPHTHALMOLOGY | Facility: CLINIC | Age: 54
End: 2024-06-05
Payer: COMMERCIAL

## 2024-06-05 PROCEDURE — 92250 FUNDUS PHOTOGRAPHY W/I&R: CPT

## 2024-06-05 PROCEDURE — 92014 COMPRE OPH EXAM EST PT 1/>: CPT

## 2024-06-06 NOTE — ED ADULT TRIAGE NOTE - TEMPERATURE IN FAHRENHEIT (DEGREES F)
Faxed referral to [facility] for driving evaluation to [phone] and received fax confirmation.   98.1

## 2024-06-12 ENCOUNTER — NON-APPOINTMENT (OUTPATIENT)
Age: 54
End: 2024-06-12

## 2024-06-12 ENCOUNTER — APPOINTMENT (OUTPATIENT)
Dept: OPHTHALMOLOGY | Facility: CLINIC | Age: 54
End: 2024-06-12
Payer: COMMERCIAL

## 2024-06-12 PROCEDURE — 92020 GONIOSCOPY: CPT

## 2024-06-12 PROCEDURE — 92012 INTRM OPH EXAM EST PATIENT: CPT

## 2024-06-12 PROCEDURE — 92133 CPTRZD OPH DX IMG PST SGM ON: CPT

## 2024-06-27 ENCOUNTER — NON-APPOINTMENT (OUTPATIENT)
Age: 54
End: 2024-06-27

## 2024-06-27 ENCOUNTER — APPOINTMENT (OUTPATIENT)
Dept: OPHTHALMOLOGY | Facility: CLINIC | Age: 54
End: 2024-06-27
Payer: COMMERCIAL

## 2024-06-27 PROCEDURE — 92012 INTRM OPH EXAM EST PATIENT: CPT | Mod: 25

## 2024-06-27 PROCEDURE — 66761 REVISION OF IRIS: CPT | Mod: LT

## 2024-06-29 NOTE — PHYSICAL EXAM
[Alert] : alert [No Acute Distress] : no acute distress [Normal Sclera/Conjunctiva] : normal sclera/conjunctiva [No Proptosis] : no proptosis [Thyroid Not Enlarged] : the thyroid was not enlarged [No Respiratory Distress] : no respiratory distress [No Accessory Muscle Use] : no accessory muscle use [Clear to Auscultation] : lungs were clear to auscultation bilaterally [Normal S1, S2] : normal S1 and S2 [Normal Rate] : heart rate was normal [Regular Rhythm] : with a regular rhythm [No Edema] : no peripheral edema [Normal Bowel Sounds] : normal bowel sounds [Not Tender] : non-tender [Soft] : abdomen soft [No Spinal Tenderness] : no spinal tenderness [Normal Gait] : normal gait [No Rash] : no rash [Right foot was examined, including] : right foot ~C was examined, including visual inspection with sensory and pulse exams [Left foot was examined, including] : left foot ~C was examined, including visual inspection with sensory and pulse exams [Normal] : normal [2+] : 2+ in the dorsalis pedis [No Tremors] : no tremors [Oriented x3] : oriented to person, place, and time [Diminished Throughout Both Feet] : normal tactile sensation with monofilament testing throughout both feet [de-identified] : MFT done today (4/9/2024)

## 2024-06-29 NOTE — HISTORY OF PRESENT ILLNESS
[FreeTextEntry1] : 54 yo M with PMH of HLD, vitamin D deficiency, h/o acute bilateral PE (2019 now off Eliquis), h/o kidney stone s/p lithotripsy (10 years ago), here to establish care for new diagnosis of Type 2 Diabetes.  ED course (3/9/2024): 53-year-old male with no past medical history coming in with hyperglycemia.  Patient has had polydipsia, polyuria. No abdominal pain.  No recent fevers chills nausea vomiting.  Patient was having intermittent blurry vision for a time, and then was told by his friends to get his blood pressure and sugar checked.  Sugar was around 400. 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   CURRENT HPI: Diagnosis: new diagnosis on 3/9/2024 Previous Endo: none Current DM Medications: metformin 500 mg 2 tabs qam (started 3/9/2024, tolerating well, takes with meals usually) Past DM medications: none  Adherence: good  A1c: 11.0% (3/9/2024) --> 9.6% (4/2/2024). Pertinent labs: TSH 0.88 (4/2/2024). Vitamin D 11.5 (4/2/2024). AST 30 wnl, AST 68 elevated, alk phos 76 (4/2/2024).  Complications: Denies CAD or CVA. Denies h/o DKA or other hospitalizations for DM. Eyes: Last ophthalmology visit 4/19/2024. Denies h/o of diabetic retinopathy. +Improved blurry vision. +Some right eye vision issues. Feet: Has not seen podiatry before, plans to schedule appt. Denies h/o neuropathy. Denies h/o foot ulcers or sores. Nephrology: eGFR 96 (4/2/2024). UACR negative (4/3/2024). Denies h/o nephropathy. Denies h/o UTIs or genital mycotic infections. +Improved polyuria or polydipsia.  Weight Change: stable at 258 lbs, wants to lose more weight  SMBG ([employer]): checks 1-2x/day fasting- 101, 103, 117, 125, 124, 107, 122, 103, 90, 99, 115, 104, 107, 124, 109, 118 Hypoglycemia: Denies hypos or hypoglycemic symptoms.  Current Diet: avoiding pastas/bread/soda/juices, used to drink a lot of juice, more veggies and protein, 2 meals a day ground turkey/fish/lean pork chops/ground beef, avocado, isma[employer] garden shrimp and string beans, steamed shrimp and broccoli without rice breakfast- lunch- dinner (when he gets home)- salad from [facility], or leftovers from earlier,  snacks- not much, sometimes pistachios drinks- mostly water and crystal lite, unsweetened tea, cut out soda/juice/energy drinks Exercise: wants to start biking, used to run cross country and was very active before and 500 situps   PMH: T2DM, HLD, vitamin D deficiency, h/o acute bilateral PE (2019 now off Eliquis), pulmonary nodule, h/o kidney stone s/p lithotripsy (10 years ago), elevated ALT Social Hx: former smoker (quit 1999), rare alcohol use FHx: dad (DM, COPD), sister (thyroid issue), denies personal or FHx of thyroid cancer or pancreatitis Current Meds: metformin 500 mg 2 tabs daily Supplements: started ergo weekly by PCP  Occupation: works in ED as registrar, works different shifts all the time (4p-12a, 8a-4pm, 4pm-7am)

## 2024-07-11 ENCOUNTER — NON-APPOINTMENT (OUTPATIENT)
Age: 54
End: 2024-07-11

## 2024-07-11 ENCOUNTER — APPOINTMENT (OUTPATIENT)
Dept: OPHTHALMOLOGY | Facility: CLINIC | Age: 54
End: 2024-07-11
Payer: COMMERCIAL

## 2024-07-11 PROCEDURE — 66761 REVISION OF IRIS: CPT | Mod: RT

## 2024-07-11 PROCEDURE — 92012 INTRM OPH EXAM EST PATIENT: CPT | Mod: 25

## 2024-07-18 ENCOUNTER — APPOINTMENT (OUTPATIENT)
Dept: OPHTHALMOLOGY | Facility: CLINIC | Age: 54
End: 2024-07-18
Payer: COMMERCIAL

## 2024-07-18 ENCOUNTER — NON-APPOINTMENT (OUTPATIENT)
Age: 54
End: 2024-07-18

## 2024-07-18 PROCEDURE — 92012 INTRM OPH EXAM EST PATIENT: CPT | Mod: 24

## 2024-07-18 PROCEDURE — 92134 CPTRZ OPH DX IMG PST SGM RTA: CPT

## 2024-07-19 ENCOUNTER — EMERGENCY (EMERGENCY)
Facility: HOSPITAL | Age: 54
LOS: 1 days | Discharge: OTHER INSTITUTION | End: 2024-07-19
Admitting: EMERGENCY MEDICINE
Payer: COMMERCIAL

## 2024-07-19 VITALS
SYSTOLIC BLOOD PRESSURE: 161 MMHG | OXYGEN SATURATION: 99 % | TEMPERATURE: 98 F | RESPIRATION RATE: 15 BRPM | DIASTOLIC BLOOD PRESSURE: 101 MMHG | HEART RATE: 90 BPM

## 2024-07-19 PROCEDURE — 99283 EMERGENCY DEPT VISIT LOW MDM: CPT

## 2024-07-19 NOTE — ED PROVIDER NOTE - OBJECTIVE STATEMENT
53-year-old well-appearing male presents due to cough and mild chest congestion.  No fever chills shortness of breath nonproductive.Progress antibiotics as it has helped with previous symptoms in the past.

## 2024-07-19 NOTE — ED ADULT TRIAGE NOTE - CHIEF COMPLAINT QUOTE
presents C/O cough x4 days. No complaints of chest pain, headache, nausea, dizziness, vomiting  SOB, fever, chills verbalized. phx Dm2

## 2024-07-19 NOTE — ED PROVIDER NOTE - CLINICAL SUMMARY MEDICAL DECISION MAKING FREE TEXT BOX
Patient presents emergency department with mild cough and chest congestion.  Overall.  She presents with DC with antibiotics as patient has been persistent approxi-7 days.  Otherwise well-appearing no wheezing.

## 2024-07-19 NOTE — ED PROVIDER NOTE - PATIENT PORTAL LINK FT
You can access the FollowMyHealth Patient Portal offered by [facility] by registering at the following website: [email or web address]. By joining [employer]’s FollowMyHealth portal, you will also be able to view your health information using other applications (apps) compatible with our system.

## 2024-07-20 RX ORDER — AMOXICILLIN/POTASSIUM CLAV 250-125 MG
875 TABLET ORAL
Qty: 10 | Refills: 0
Start: 2024-07-20 | End: 2024-07-24

## 2024-07-20 RX ORDER — BENZONATATE 100 MG/1
1 TABLET ORAL
Qty: 8 | Refills: 0
Start: 2024-07-20 | End: 2024-07-23

## 2024-07-20 RX ORDER — AMOXICILLIN 500 MG
1 CAPSULE ORAL
Qty: 14 | Refills: 0
Start: 2024-07-20 | End: 2024-07-26

## 2024-07-21 RX ORDER — AMOXICILLIN/POTASSIUM CLAV 250-125 MG
1 TABLET ORAL
Qty: 20 | Refills: 0
Start: 2024-07-21 | End: 2024-07-30

## 2024-07-21 RX ORDER — BENZONATATE 100 MG/1
1 TABLET ORAL
Qty: 8 | Refills: 0
Start: 2024-07-21 | End: 2024-07-24

## 2024-07-21 NOTE — ED POST DISCHARGE NOTE - REASON FOR FOLLOW-UP
Patient called that Augmentin and benzotoate went to wrong pharmacy . Re sent RXs to Patient's preferred pharmacy. Other

## 2024-07-25 ENCOUNTER — APPOINTMENT (OUTPATIENT)
Dept: ENDOCRINOLOGY | Facility: CLINIC | Age: 54
End: 2024-07-25
Payer: COMMERCIAL

## 2024-07-25 VITALS
OXYGEN SATURATION: 96 % | HEIGHT: 73 IN | WEIGHT: 237 LBS | BODY MASS INDEX: 31.41 KG/M2 | HEART RATE: 70 BPM | DIASTOLIC BLOOD PRESSURE: 80 MMHG | SYSTOLIC BLOOD PRESSURE: 124 MMHG

## 2024-07-25 DIAGNOSIS — Z00.00 ENCOUNTER FOR GENERAL ADULT MEDICAL EXAMINATION W/OUT ABNORMAL FINDINGS: ICD-10-CM

## 2024-07-25 DIAGNOSIS — E56.9 VITAMIN DEFICIENCY, UNSPECIFIED: ICD-10-CM

## 2024-07-25 DIAGNOSIS — E78.5 HYPERLIPIDEMIA, UNSPECIFIED: ICD-10-CM

## 2024-07-25 DIAGNOSIS — E11.65 TYPE 2 DIABETES MELLITUS WITH HYPERGLYCEMIA: ICD-10-CM

## 2024-07-25 DIAGNOSIS — Z86.39 PERSONAL HISTORY OF OTHER ENDOCRINE, NUTRITIONAL AND METABOLIC DISEASE: ICD-10-CM

## 2024-07-25 LAB
GLUCOSE BLDC GLUCOMTR-MCNC: 93
HBA1C MFR BLD HPLC: 5.6

## 2024-07-25 PROCEDURE — 82962 GLUCOSE BLOOD TEST: CPT

## 2024-07-25 PROCEDURE — G2211 COMPLEX E/M VISIT ADD ON: CPT

## 2024-07-25 PROCEDURE — 83036 HEMOGLOBIN GLYCOSYLATED A1C: CPT | Mod: QW

## 2024-07-25 PROCEDURE — 99204 OFFICE O/P NEW MOD 45 MIN: CPT

## 2024-07-25 NOTE — ASSESSMENT
[Diabetes Foot Care] : diabetes foot care [Long Term Vascular Complications] : long term vascular complications of diabetes [Carbohydrate Consistent Diet] : carbohydrate consistent diet [Importance of Diet and Exercise] : importance of diet and exercise to improve glycemic control, achieve weight loss and improve cardiovascular health [Exercise/Effect on Glucose] : exercise/effect on glucose [Hypoglycemia Management] : hypoglycemia management [Self Monitoring of Blood Glucose] : self monitoring of blood glucose [Retinopathy Screening] : Patient was referred to ophthalmology for retinopathy screening [Weight Loss] : weight loss [Diabetic Medications] : Risks and benefits of diabetic medications were discussed [FreeTextEntry1] : 54 yo M with PMH of HLD, vitamin D deficiency, h/o acute bilateral PE (2019 now off Eliquis), h/o kidney stone s/p lithotripsy (10 years ago), here for follow up of DM2.   Type 2 Diabetes: -  A1c 5.6% in office today, significantly improved. Has been making lifestyle/dietary changes. Cut out regular juices/sodas completely and eating better now her reports. Down 20lbs since last visit.  - Glucose log reviewed: BG levels high 70s-90s.  - reduce to Metformin 500mg daily. Advised to check staggered BG, if elevated BG then should increase metformin back to 1000mg daily.  - May be future candidate for GLP-1 if needed for further glycemic control or weight loss benefit (BMI 34). R+B of GLP-1 discussed with the patient. Prefers to be on little medication as possible. Would like to try to come off Metformin too if possible. Denies personal or family history of thyroid cancer or pancreatitis. Patient prefers to work on lifestyle modifications first. - Discussed hypoglycemia management.  - Discussed diet modification, carb consistent diet, and exercise. Has already made dietary changes, is motivated to start exercising more again. Recommend to increase physical activity, aim for 30mins/5 days per week of moderate intensity cardiovascular exercise + incorporate strength training.  - Continue SMBG 2-3x daily in staggered manner (keep glucose log/bring meter to all visits and send readings to us). Call if persistent highs or lows. Fasting glucose goal 80 to 130, 2 hours after eating < 180, before meals < 140 and hypoglycemia is < 70.  - F/u with ophthalmology and podiatry annually. UTD with ophtho. Needs to schedule podiatry appt. CORINE khan in April 2024.   BP: - Goal BP <130/80, BP controlled today, not on any antihypertensives. /80, HR 70 - Denies h/o HTN. Not on antihypertensives. - eGFR 96 (4/2/2024). UACR negative (4/3/2024).   HLD: - Goal LDL <70. , HDL 34,  (4/2/2024). - Not on statin. Patient defers statin and prefers to work on lifestyle modifications first. - Repeat fasting lipid panel.  - Continue with diet management, low fat diet, and exercise.  Vitamin D deficiency: - Vitamin D 11.5 (4/2/2024).  - s/p ergocalciferol  - repeat Vitamin D 25 OH level & supplement accordingly    Patient verbalized understanding of recommendations and agrees to plan as detailed above.  RTC in 4-5 months

## 2024-07-25 NOTE — PHYSICAL EXAM
[Alert] : alert [No Acute Distress] : no acute distress [Normal Sclera/Conjunctiva] : normal sclera/conjunctiva [No Proptosis] : no proptosis [Thyroid Not Enlarged] : the thyroid was not enlarged [No Respiratory Distress] : no respiratory distress [No Accessory Muscle Use] : no accessory muscle use [Normal Rate] : heart rate was normal [Regular Rhythm] : with a regular rhythm [No Edema] : no peripheral edema [Not Tender] : non-tender [Soft] : abdomen soft [No Spinal Tenderness] : no spinal tenderness [Normal Gait] : normal gait [No Rash] : no rash [Right foot was examined, including] : right foot ~C was examined, including visual inspection with sensory and pulse exams [Left foot was examined, including] : left foot ~C was examined, including visual inspection with sensory and pulse exams [Normal] : normal [2+] : 2+ in the dorsalis pedis [No Tremors] : no tremors [Oriented x3] : oriented to person, place, and time [EOMI] : extra ocular movement intact [Normal Outer Ear/Nose] : the ears and nose were normal in appearance [Normal Rate and Effort] : normal respiratory rate and effort [Not Distended] : not distended [Normal Strength/Tone] : muscle strength and tone were normal [Normal Insight/Judgement] : insight and judgment were intact [Diminished Throughout Both Feet] : normal tactile sensation with monofilament testing throughout both feet [de-identified] : DIVYA wnl (4/9/2024)

## 2024-07-25 NOTE — HISTORY OF PRESENT ILLNESS
[FreeTextEntry1] : 52 yo M with PMH of HLD, vitamin D deficiency, h/o acute bilateral PE (2019 now off Eliquis), h/o kidney stone s/p lithotripsy (10 years ago), here to establish care for new diagnosis of Type 2 Diabetes.  ED course (3/9/2024): 53-year-old male with no past medical history coming in with hyperglycemia.  Patient has had polydipsia, polyuria. No abdominal pain.  No recent fevers chills nausea vomiting.  Patient was having intermittent blurry vision for a time, and then was told by his friends to get his blood pressure and sugar checked.  Sugar was around 400. 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 Works a varied schedule. Sometimes nights, days, sometimes double shifts.  Down almost 20lbs since last visit. Sleeps well. Has changed his diet significantly. Needs to work on increasing exercise levels.  A1c 5.6% in office today.    CURRENT HPI: Diagnosis: new diagnosis on 3/9/2024 Previous Endo: none Current DM Medications: metformin 500 mg 2 tabs qam (started 3/9/2024, tolerating well, takes with meals usually) Past DM medications: none  Adherence: good  A1c: 11.0% (3/9/2024) --> 9.6% (4/2/2024) --> 5.6% (7/2024) Pertinent labs: TSH 0.88 (4/2/2024). Vitamin D 11.5 (4/2/2024). AST 30 wnl, AST 68 elevated, alk phos 76 wnl (4/2/2024).  Complications: Denies CAD or CVA. Denies h/o DKA or other hospitalizations for DM. Eyes: Last ophthalmology visit 4/19/2024. Denies h/o of diabetic retinopathy. +Improved blurry vision. +Has a glaucoma in R. eye and swelling noted that he is seeing a retinal specialist for. has hx of b/l Iridotomy, going for a revision in August 2024 of L. eye. Central retinal vein occlusion per last optho note.  Feet: Has not seen podiatry before, plans to schedule appt - has someone that was recommended. Denies h/o neuropathy. Denies h/o foot ulcers or sores. Nephrology: eGFR 96 (4/2/2024). UACR negative (4/3/2024). Denies h/o nephropathy. Denies h/o UTIs or genital mycotic infections. , HDL 34, ,  - not currently on statin +Improved polyuria or polydipsia.   SMBG ([facility]): checks 1-2x/day Ranging 79-92, checking at various times of the day  Hypoglycemia: Denies hypos or hypoglycemic symptoms.  Current Diet: avoiding pastas/bread/soda/juices, used to drink a lot of juice, more veggies and protein, 2 meals a day ground turkey/fish/lean pork chops/ground beef, avocado, [employer] shrimp and string beans, steamed shrimp and broccoli without rice usually one meal a day while at work, sometimes another smaller meal once he is back home, usually sleeping at home before his next shift  dinner (when he gets home)- salad from [email or web address], or leftovers from earlier,  snacks- not much, sometimes pistachios drinks- mostly water and crystal lite, unsweetened tea, cut out soda/juice/energy drinks- reports he was drinking a lot of sweetened beverages prior to his dx.  Exercise: wants to start biking, used to run cross country and was very active before and 500 situps   PMH: T2DM, HLD, vitamin D deficiency, h/o acute bilateral PE (2019 now off Eliquis), pulmonary nodule, h/o kidney stone s/p lithotripsy (10 years ago), elevated ALT Social Hx: former smoker (quit 1999), rare alcohol use FHx: dad (DM, COPD), sister (thyroid issue), denies personal or FHx of thyroid cancer or pancreatitis Current Meds: metformin 500 mg 2 tabs daily Supplements: was previously on ergo, now completed - vitamin d 25 oh 11.5 Occupation: works in ED as registrar, works different shifts all the time (4p-12a, 8a-4pm, 4pm-7am)

## 2024-07-30 ENCOUNTER — APPOINTMENT (OUTPATIENT)
Dept: OPHTHALMOLOGY | Facility: CLINIC | Age: 54
End: 2024-07-30
Payer: COMMERCIAL

## 2024-07-30 ENCOUNTER — NON-APPOINTMENT (OUTPATIENT)
Age: 54
End: 2024-07-30

## 2024-07-30 PROCEDURE — 66761 REVISION OF IRIS: CPT | Mod: LT

## 2024-07-30 PROCEDURE — 92012 INTRM OPH EXAM EST PATIENT: CPT | Mod: 25

## 2024-09-11 ENCOUNTER — APPOINTMENT (OUTPATIENT)
Dept: OPHTHALMOLOGY | Facility: CLINIC | Age: 54
End: 2024-09-11
Payer: COMMERCIAL

## 2024-09-11 ENCOUNTER — NON-APPOINTMENT (OUTPATIENT)
Age: 54
End: 2024-09-11

## 2024-09-11 PROCEDURE — 92012 INTRM OPH EXAM EST PATIENT: CPT

## 2024-09-11 PROCEDURE — 92020 GONIOSCOPY: CPT

## 2024-10-23 ENCOUNTER — APPOINTMENT (OUTPATIENT)
Dept: OPHTHALMOLOGY | Facility: CLINIC | Age: 54
End: 2024-10-23

## 2024-10-30 ENCOUNTER — APPOINTMENT (OUTPATIENT)
Dept: OPHTHALMOLOGY | Facility: CLINIC | Age: 54
End: 2024-10-30
Payer: COMMERCIAL

## 2024-10-30 ENCOUNTER — NON-APPOINTMENT (OUTPATIENT)
Age: 54
End: 2024-10-30

## 2024-10-30 PROCEDURE — 92012 INTRM OPH EXAM EST PATIENT: CPT

## 2024-10-30 PROCEDURE — 92134 CPTRZ OPH DX IMG PST SGM RTA: CPT

## 2024-11-13 ENCOUNTER — APPOINTMENT (OUTPATIENT)
Dept: OPHTHALMOLOGY | Facility: CLINIC | Age: 54
End: 2024-11-13

## 2025-01-30 ENCOUNTER — APPOINTMENT (OUTPATIENT)
Dept: OPHTHALMOLOGY | Facility: CLINIC | Age: 55
End: 2025-01-30

## 2025-02-13 NOTE — ED ADULT TRIAGE NOTE - NS ED TRIAGE AVPU SCALE
1010 RELEASED TO ASC RN V/S [id number] SITTER AT BEDSIDE.  
931 RECEIVED TO RR AWAKE, ALERT, TALKATIVE. COLOR PINK. RESP. UNLABORED. DRESSING RIGHT HAND D/I, FINGERS MOBILE, PINK, VERBALIZES NUMBNESS AND TINGLING. ARM SLING RIGHT ARM. IV INFUSING WELL LEFT HAND 22G. CATH. NO C/O PAIN. BLOOD SUGAR 191. SEE FLOW SHEET.    1001 AWAKE, ALERT. NO C/O PAIN. NO DISTRESS NOTED. TRANSFERRED TO ROOM.  
Alert-The patient is alert, awake and responds to voice. The patient is oriented to time, place, and person. The triage nurse is able to obtain subjective information.

## 2025-02-18 ENCOUNTER — APPOINTMENT (OUTPATIENT)
Dept: ENDOCRINOLOGY | Facility: CLINIC | Age: 55
End: 2025-02-18